# Patient Record
Sex: MALE | Employment: UNEMPLOYED | URBAN - METROPOLITAN AREA
[De-identification: names, ages, dates, MRNs, and addresses within clinical notes are randomized per-mention and may not be internally consistent; named-entity substitution may affect disease eponyms.]

---

## 2021-01-23 ENCOUNTER — APPOINTMENT (OUTPATIENT)
Dept: RADIOLOGY | Facility: MEDICAL CENTER | Age: 61
DRG: 024 | End: 2021-01-23
Attending: STUDENT IN AN ORGANIZED HEALTH CARE EDUCATION/TRAINING PROGRAM
Payer: COMMERCIAL

## 2021-01-23 ENCOUNTER — APPOINTMENT (OUTPATIENT)
Dept: RADIOLOGY | Facility: MEDICAL CENTER | Age: 61
DRG: 024 | End: 2021-01-23
Attending: EMERGENCY MEDICINE
Payer: COMMERCIAL

## 2021-01-23 ENCOUNTER — HOSPITAL ENCOUNTER (INPATIENT)
Facility: MEDICAL CENTER | Age: 61
LOS: 5 days | DRG: 024 | End: 2021-01-28
Attending: EMERGENCY MEDICINE | Admitting: STUDENT IN AN ORGANIZED HEALTH CARE EDUCATION/TRAINING PROGRAM
Payer: COMMERCIAL

## 2021-01-23 DIAGNOSIS — I63.512 CEREBROVASCULAR ACCIDENT (CVA) DUE TO OCCLUSION OF LEFT MIDDLE CEREBRAL ARTERY (HCC): ICD-10-CM

## 2021-01-23 DIAGNOSIS — I63.9 CEREBROVASCULAR ACCIDENT (CVA), UNSPECIFIED MECHANISM (HCC): ICD-10-CM

## 2021-01-23 LAB
ABO + RH BLD: NORMAL
ABO GROUP BLD: NORMAL
ALBUMIN SERPL BCP-MCNC: 4.7 G/DL (ref 3.2–4.9)
ALBUMIN/GLOB SERPL: 1.6 G/DL
ALP SERPL-CCNC: 63 U/L (ref 30–99)
ALT SERPL-CCNC: 28 U/L (ref 2–50)
ANION GAP SERPL CALC-SCNC: 13 MMOL/L (ref 7–16)
APTT PPP: 26.1 SEC (ref 24.7–36)
AST SERPL-CCNC: 28 U/L (ref 12–45)
BASOPHILS # BLD AUTO: 0.6 % (ref 0–1.8)
BASOPHILS # BLD: 0.04 K/UL (ref 0–0.12)
BILIRUB SERPL-MCNC: 0.4 MG/DL (ref 0.1–1.5)
BLD GP AB SCN SERPL QL: NORMAL
BUN SERPL-MCNC: 11 MG/DL (ref 8–22)
CALCIUM SERPL-MCNC: 9.2 MG/DL (ref 8.5–10.5)
CHLORIDE SERPL-SCNC: 103 MMOL/L (ref 96–112)
CO2 SERPL-SCNC: 22 MMOL/L (ref 20–33)
CREAT SERPL-MCNC: 0.88 MG/DL (ref 0.5–1.4)
EKG IMPRESSION: NORMAL
EOSINOPHIL # BLD AUTO: 0.05 K/UL (ref 0–0.51)
EOSINOPHIL NFR BLD: 0.7 % (ref 0–6.9)
ERYTHROCYTE [DISTWIDTH] IN BLOOD BY AUTOMATED COUNT: 47.1 FL (ref 35.9–50)
EST. AVERAGE GLUCOSE BLD GHB EST-MCNC: 123 MG/DL
GLOBULIN SER CALC-MCNC: 3 G/DL (ref 1.9–3.5)
GLUCOSE SERPL-MCNC: 88 MG/DL (ref 65–99)
HBA1C MFR BLD: 5.9 % (ref 0–5.6)
HCT VFR BLD AUTO: 48.8 % (ref 42–52)
HGB BLD-MCNC: 16.6 G/DL (ref 14–18)
IMM GRANULOCYTES # BLD AUTO: 0.02 K/UL (ref 0–0.11)
IMM GRANULOCYTES NFR BLD AUTO: 0.3 % (ref 0–0.9)
INR PPP: 1.11 (ref 0.87–1.13)
LYMPHOCYTES # BLD AUTO: 1.65 K/UL (ref 1–4.8)
LYMPHOCYTES NFR BLD: 24.1 % (ref 22–41)
MAGNESIUM SERPL-MCNC: 2.4 MG/DL (ref 1.5–2.5)
MCH RBC QN AUTO: 33.3 PG (ref 27–33)
MCHC RBC AUTO-ENTMCNC: 34 G/DL (ref 33.7–35.3)
MCV RBC AUTO: 97.8 FL (ref 81.4–97.8)
MONOCYTES # BLD AUTO: 0.75 K/UL (ref 0–0.85)
MONOCYTES NFR BLD AUTO: 10.9 % (ref 0–13.4)
NEUTROPHILS # BLD AUTO: 4.35 K/UL (ref 1.82–7.42)
NEUTROPHILS NFR BLD: 63.4 % (ref 44–72)
NRBC # BLD AUTO: 0 K/UL
NRBC BLD-RTO: 0 /100 WBC
PLATELET # BLD AUTO: 186 K/UL (ref 164–446)
PMV BLD AUTO: 10 FL (ref 9–12.9)
POTASSIUM SERPL-SCNC: 4.2 MMOL/L (ref 3.6–5.5)
PROT SERPL-MCNC: 7.7 G/DL (ref 6–8.2)
PROTHROMBIN TIME: 14.7 SEC (ref 12–14.6)
RBC # BLD AUTO: 4.99 M/UL (ref 4.7–6.1)
RH BLD: NORMAL
SARS-COV+SARS-COV-2 AG RESP QL IA.RAPID: NOTDETECTED
SODIUM SERPL-SCNC: 138 MMOL/L (ref 135–145)
SPECIMEN SOURCE: NORMAL
TROPONIN T SERPL-MCNC: 12 NG/L (ref 6–19)
WBC # BLD AUTO: 6.9 K/UL (ref 4.8–10.8)

## 2021-01-23 PROCEDURE — 71045 X-RAY EXAM CHEST 1 VIEW: CPT

## 2021-01-23 PROCEDURE — U0005 INFEC AGEN DETEC AMPLI PROBE: HCPCS

## 2021-01-23 PROCEDURE — 85730 THROMBOPLASTIN TIME PARTIAL: CPT

## 2021-01-23 PROCEDURE — 70498 CT ANGIOGRAPHY NECK: CPT

## 2021-01-23 PROCEDURE — 86850 RBC ANTIBODY SCREEN: CPT

## 2021-01-23 PROCEDURE — 84484 ASSAY OF TROPONIN QUANT: CPT

## 2021-01-23 PROCEDURE — 70450 CT HEAD/BRAIN W/O DYE: CPT

## 2021-01-23 PROCEDURE — 99223 1ST HOSP IP/OBS HIGH 75: CPT | Performed by: STUDENT IN AN ORGANIZED HEALTH CARE EDUCATION/TRAINING PROGRAM

## 2021-01-23 PROCEDURE — 85025 COMPLETE CBC W/AUTO DIFF WBC: CPT

## 2021-01-23 PROCEDURE — 83036 HEMOGLOBIN GLYCOSYLATED A1C: CPT

## 2021-01-23 PROCEDURE — 96375 TX/PRO/DX INJ NEW DRUG ADDON: CPT

## 2021-01-23 PROCEDURE — 86900 BLOOD TYPING SEROLOGIC ABO: CPT

## 2021-01-23 PROCEDURE — U0003 INFECTIOUS AGENT DETECTION BY NUCLEIC ACID (DNA OR RNA); SEVERE ACUTE RESPIRATORY SYNDROME CORONAVIRUS 2 (SARS-COV-2) (CORONAVIRUS DISEASE [COVID-19]), AMPLIFIED PROBE TECHNIQUE, MAKING USE OF HIGH THROUGHPUT TECHNOLOGIES AS DESCRIBED BY CMS-2020-01-R: HCPCS

## 2021-01-23 PROCEDURE — 700117 HCHG RX CONTRAST REV CODE 255: Performed by: EMERGENCY MEDICINE

## 2021-01-23 PROCEDURE — 85610 PROTHROMBIN TIME: CPT

## 2021-01-23 PROCEDURE — 99291 CRITICAL CARE FIRST HOUR: CPT

## 2021-01-23 PROCEDURE — 0042T CT-CEREBRAL PERFUSION ANALYSIS: CPT

## 2021-01-23 PROCEDURE — 36415 COLL VENOUS BLD VENIPUNCTURE: CPT

## 2021-01-23 PROCEDURE — 770022 HCHG ROOM/CARE - ICU (200)

## 2021-01-23 PROCEDURE — 80053 COMPREHEN METABOLIC PANEL: CPT

## 2021-01-23 PROCEDURE — 86901 BLOOD TYPING SEROLOGIC RH(D): CPT

## 2021-01-23 PROCEDURE — 700111 HCHG RX REV CODE 636 W/ 250 OVERRIDE (IP)

## 2021-01-23 PROCEDURE — 70496 CT ANGIOGRAPHY HEAD: CPT

## 2021-01-23 PROCEDURE — 700111 HCHG RX REV CODE 636 W/ 250 OVERRIDE (IP): Performed by: RADIOLOGY

## 2021-01-23 PROCEDURE — 83735 ASSAY OF MAGNESIUM: CPT

## 2021-01-23 PROCEDURE — 93005 ELECTROCARDIOGRAM TRACING: CPT | Performed by: EMERGENCY MEDICINE

## 2021-01-23 PROCEDURE — 87426 SARSCOV CORONAVIRUS AG IA: CPT

## 2021-01-23 PROCEDURE — 99221 1ST HOSP IP/OBS SF/LOW 40: CPT | Performed by: PSYCHIATRY & NEUROLOGY

## 2021-01-23 RX ORDER — AMOXICILLIN 250 MG
2 CAPSULE ORAL 2 TIMES DAILY
Status: DISCONTINUED | OUTPATIENT
Start: 2021-01-24 | End: 2021-01-28 | Stop reason: HOSPADM

## 2021-01-23 RX ORDER — EPTIFIBATIDE 0.75 MG/ML
2 INJECTION, SOLUTION INTRAVENOUS CONTINUOUS
Status: DISPENSED | OUTPATIENT
Start: 2021-01-24 | End: 2021-01-24

## 2021-01-23 RX ORDER — ASPIRIN 300 MG/1
300 SUPPOSITORY RECTAL DAILY
Status: DISCONTINUED | OUTPATIENT
Start: 2021-01-25 | End: 2021-01-24

## 2021-01-23 RX ORDER — MIDAZOLAM HYDROCHLORIDE 1 MG/ML
INJECTION INTRAMUSCULAR; INTRAVENOUS
Status: COMPLETED
Start: 2021-01-23 | End: 2021-01-24

## 2021-01-23 RX ORDER — ASPIRIN 325 MG
325 TABLET ORAL DAILY
Status: DISCONTINUED | OUTPATIENT
Start: 2021-01-25 | End: 2021-01-24

## 2021-01-23 RX ORDER — BISACODYL 10 MG
10 SUPPOSITORY, RECTAL RECTAL
Status: DISCONTINUED | OUTPATIENT
Start: 2021-01-23 | End: 2021-01-28 | Stop reason: HOSPADM

## 2021-01-23 RX ORDER — POLYETHYLENE GLYCOL 3350 17 G/17G
1 POWDER, FOR SOLUTION ORAL
Status: DISCONTINUED | OUTPATIENT
Start: 2021-01-23 | End: 2021-01-28 | Stop reason: HOSPADM

## 2021-01-23 RX ORDER — ASPIRIN 81 MG/1
324 TABLET, CHEWABLE ORAL DAILY
Status: DISCONTINUED | OUTPATIENT
Start: 2021-01-25 | End: 2021-01-24

## 2021-01-23 RX ADMIN — IOHEXOL 40 ML: 350 INJECTION, SOLUTION INTRAVENOUS at 22:15

## 2021-01-23 RX ADMIN — EPTIFIBATIDE 12600 MCG: 2 INJECTION, SOLUTION INTRAVENOUS at 23:56

## 2021-01-23 RX ADMIN — IOHEXOL 80 ML: 350 INJECTION, SOLUTION INTRAVENOUS at 22:21

## 2021-01-24 ENCOUNTER — APPOINTMENT (OUTPATIENT)
Dept: RADIOLOGY | Facility: MEDICAL CENTER | Age: 61
DRG: 024 | End: 2021-01-24
Attending: INTERNAL MEDICINE
Payer: COMMERCIAL

## 2021-01-24 ENCOUNTER — APPOINTMENT (OUTPATIENT)
Dept: RADIOLOGY | Facility: MEDICAL CENTER | Age: 61
DRG: 024 | End: 2021-01-24
Attending: STUDENT IN AN ORGANIZED HEALTH CARE EDUCATION/TRAINING PROGRAM
Payer: COMMERCIAL

## 2021-01-24 ENCOUNTER — APPOINTMENT (OUTPATIENT)
Dept: RADIOLOGY | Facility: MEDICAL CENTER | Age: 61
DRG: 024 | End: 2021-01-24
Attending: PSYCHIATRY & NEUROLOGY
Payer: COMMERCIAL

## 2021-01-24 ENCOUNTER — APPOINTMENT (OUTPATIENT)
Dept: CARDIOLOGY | Facility: MEDICAL CENTER | Age: 61
DRG: 024 | End: 2021-01-24
Attending: STUDENT IN AN ORGANIZED HEALTH CARE EDUCATION/TRAINING PROGRAM
Payer: COMMERCIAL

## 2021-01-24 DIAGNOSIS — I63.512 CEREBROVASCULAR ACCIDENT (CVA) DUE TO OCCLUSION OF LEFT MIDDLE CEREBRAL ARTERY (HCC): Primary | ICD-10-CM

## 2021-01-24 PROBLEM — R73.03 PREDIABETES: Status: ACTIVE | Noted: 2021-01-24

## 2021-01-24 PROBLEM — F10.10 ALCOHOL ABUSE: Status: ACTIVE | Noted: 2021-01-24

## 2021-01-24 PROBLEM — I65.21 CAROTID OCCLUSION, RIGHT: Status: ACTIVE | Noted: 2021-01-24

## 2021-01-24 PROBLEM — I65.22 INTERNAL CAROTID ARTERY OCCLUSION, LEFT: Status: ACTIVE | Noted: 2021-01-24

## 2021-01-24 PROBLEM — Z72.0 TOBACCO ABUSE: Status: ACTIVE | Noted: 2021-01-24

## 2021-01-24 LAB
ALBUMIN SERPL BCP-MCNC: 3.5 G/DL (ref 3.2–4.9)
ALBUMIN/GLOB SERPL: 1.4 G/DL
ALP SERPL-CCNC: 50 U/L (ref 30–99)
ALT SERPL-CCNC: 22 U/L (ref 2–50)
AMPHET UR QL SCN: NEGATIVE
ANION GAP SERPL CALC-SCNC: 13 MMOL/L (ref 7–16)
AST SERPL-CCNC: 18 U/L (ref 12–45)
BARBITURATES UR QL SCN: NEGATIVE
BASOPHILS # BLD AUTO: 0.4 % (ref 0–1.8)
BASOPHILS # BLD: 0.03 K/UL (ref 0–0.12)
BENZODIAZ UR QL SCN: NEGATIVE
BILIRUB SERPL-MCNC: 0.5 MG/DL (ref 0.1–1.5)
BUN SERPL-MCNC: 10 MG/DL (ref 8–22)
BZE UR QL SCN: NEGATIVE
CALCIUM SERPL-MCNC: 7.6 MG/DL (ref 8.5–10.5)
CANNABINOIDS UR QL SCN: NEGATIVE
CHLORIDE SERPL-SCNC: 102 MMOL/L (ref 96–112)
CHOLEST SERPL-MCNC: 149 MG/DL (ref 100–199)
CO2 SERPL-SCNC: 19 MMOL/L (ref 20–33)
CREAT SERPL-MCNC: 0.9 MG/DL (ref 0.5–1.4)
EOSINOPHIL # BLD AUTO: 0.03 K/UL (ref 0–0.51)
EOSINOPHIL NFR BLD: 0.4 % (ref 0–6.9)
ERYTHROCYTE [DISTWIDTH] IN BLOOD BY AUTOMATED COUNT: 47.8 FL (ref 35.9–50)
GLOBULIN SER CALC-MCNC: 2.5 G/DL (ref 1.9–3.5)
GLUCOSE SERPL-MCNC: 96 MG/DL (ref 65–99)
HCT VFR BLD AUTO: 40.3 % (ref 42–52)
HDLC SERPL-MCNC: 37 MG/DL
HGB BLD-MCNC: 13.7 G/DL (ref 14–18)
IMM GRANULOCYTES # BLD AUTO: 0.03 K/UL (ref 0–0.11)
IMM GRANULOCYTES NFR BLD AUTO: 0.4 % (ref 0–0.9)
LDLC SERPL CALC-MCNC: 80 MG/DL
LV EJECT FRACT MOD 2C 99903: 77.69
LV EJECT FRACT MOD 4C 99902: 73.84
LV EJECT FRACT MOD BP 99901: 75.03
LYMPHOCYTES # BLD AUTO: 1.07 K/UL (ref 1–4.8)
LYMPHOCYTES NFR BLD: 14.8 % (ref 22–41)
MCH RBC QN AUTO: 33.7 PG (ref 27–33)
MCHC RBC AUTO-ENTMCNC: 34 G/DL (ref 33.7–35.3)
MCV RBC AUTO: 99 FL (ref 81.4–97.8)
METHADONE UR QL SCN: NEGATIVE
MONOCYTES # BLD AUTO: 0.57 K/UL (ref 0–0.85)
MONOCYTES NFR BLD AUTO: 7.9 % (ref 0–13.4)
NEUTROPHILS # BLD AUTO: 5.5 K/UL (ref 1.82–7.42)
NEUTROPHILS NFR BLD: 76.1 % (ref 44–72)
NRBC # BLD AUTO: 0 K/UL
NRBC BLD-RTO: 0 /100 WBC
OPIATES UR QL SCN: NEGATIVE
OXYCODONE UR QL SCN: NEGATIVE
PCP UR QL SCN: NEGATIVE
PLATELET # BLD AUTO: 160 K/UL (ref 164–446)
PMV BLD AUTO: 10 FL (ref 9–12.9)
POTASSIUM SERPL-SCNC: 4.7 MMOL/L (ref 3.6–5.5)
PROPOXYPH UR QL SCN: NEGATIVE
PROT SERPL-MCNC: 6 G/DL (ref 6–8.2)
RBC # BLD AUTO: 4.07 M/UL (ref 4.7–6.1)
SARS-COV-2 RNA RESP QL NAA+PROBE: NOTDETECTED
SODIUM SERPL-SCNC: 134 MMOL/L (ref 135–145)
SPECIMEN SOURCE: NORMAL
TRIGL SERPL-MCNC: 162 MG/DL (ref 0–149)
WBC # BLD AUTO: 7.2 K/UL (ref 4.8–10.8)

## 2021-01-24 PROCEDURE — 700111 HCHG RX REV CODE 636 W/ 250 OVERRIDE (IP): Performed by: INTERNAL MEDICINE

## 2021-01-24 PROCEDURE — 80061 LIPID PANEL: CPT

## 2021-01-24 PROCEDURE — 80307 DRUG TEST PRSMV CHEM ANLYZR: CPT

## 2021-01-24 PROCEDURE — 96375 TX/PRO/DX INJ NEW DRUG ADDON: CPT

## 2021-01-24 PROCEDURE — 037L3DZ DILATION OF LEFT INTERNAL CAROTID ARTERY WITH INTRALUMINAL DEVICE, PERCUTANEOUS APPROACH: ICD-10-PCS | Performed by: RADIOLOGY

## 2021-01-24 PROCEDURE — 770022 HCHG ROOM/CARE - ICU (200)

## 2021-01-24 PROCEDURE — 700111 HCHG RX REV CODE 636 W/ 250 OVERRIDE (IP): Performed by: STUDENT IN AN ORGANIZED HEALTH CARE EDUCATION/TRAINING PROGRAM

## 2021-01-24 PROCEDURE — 700102 HCHG RX REV CODE 250 W/ 637 OVERRIDE(OP): Performed by: STUDENT IN AN ORGANIZED HEALTH CARE EDUCATION/TRAINING PROGRAM

## 2021-01-24 PROCEDURE — 93306 TTE W/DOPPLER COMPLETE: CPT

## 2021-01-24 PROCEDURE — A9270 NON-COVERED ITEM OR SERVICE: HCPCS | Performed by: STUDENT IN AN ORGANIZED HEALTH CARE EDUCATION/TRAINING PROGRAM

## 2021-01-24 PROCEDURE — 99292 CRITICAL CARE ADDL 30 MIN: CPT | Performed by: INTERNAL MEDICINE

## 2021-01-24 PROCEDURE — 85025 COMPLETE CBC W/AUTO DIFF WBC: CPT

## 2021-01-24 PROCEDURE — 96365 THER/PROPH/DIAG IV INF INIT: CPT

## 2021-01-24 PROCEDURE — C1887 CATHETER, GUIDING: HCPCS

## 2021-01-24 PROCEDURE — 700105 HCHG RX REV CODE 258: Performed by: INTERNAL MEDICINE

## 2021-01-24 PROCEDURE — 92610 EVALUATE SWALLOWING FUNCTION: CPT

## 2021-01-24 PROCEDURE — 80053 COMPREHEN METABOLIC PANEL: CPT

## 2021-01-24 PROCEDURE — 03CL3ZZ EXTIRPATION OF MATTER FROM LEFT INTERNAL CAROTID ARTERY, PERCUTANEOUS APPROACH: ICD-10-PCS | Performed by: RADIOLOGY

## 2021-01-24 PROCEDURE — 70551 MRI BRAIN STEM W/O DYE: CPT

## 2021-01-24 PROCEDURE — 700105 HCHG RX REV CODE 258: Performed by: RADIOLOGY

## 2021-01-24 PROCEDURE — 700111 HCHG RX REV CODE 636 W/ 250 OVERRIDE (IP): Performed by: RADIOLOGY

## 2021-01-24 PROCEDURE — 70450 CT HEAD/BRAIN W/O DYE: CPT

## 2021-01-24 PROCEDURE — 700111 HCHG RX REV CODE 636 W/ 250 OVERRIDE (IP)

## 2021-01-24 PROCEDURE — 700117 HCHG RX CONTRAST REV CODE 255: Performed by: RADIOLOGY

## 2021-01-24 PROCEDURE — 70544 MR ANGIOGRAPHY HEAD W/O DYE: CPT

## 2021-01-24 PROCEDURE — 99291 CRITICAL CARE FIRST HOUR: CPT | Performed by: INTERNAL MEDICINE

## 2021-01-24 PROCEDURE — 99153 MOD SED SAME PHYS/QHP EA: CPT

## 2021-01-24 PROCEDURE — 93306 TTE W/DOPPLER COMPLETE: CPT | Mod: 26 | Performed by: STUDENT IN AN ORGANIZED HEALTH CARE EDUCATION/TRAINING PROGRAM

## 2021-01-24 RX ORDER — GAUZE BANDAGE 2" X 2"
100 BANDAGE TOPICAL DAILY
Status: DISCONTINUED | OUTPATIENT
Start: 2021-01-27 | End: 2021-01-28 | Stop reason: HOSPADM

## 2021-01-24 RX ORDER — ASPIRIN 325 MG
325 TABLET ORAL ONCE
Status: COMPLETED | OUTPATIENT
Start: 2021-01-24 | End: 2021-01-24

## 2021-01-24 RX ORDER — FOLIC ACID 1 MG/1
1 TABLET ORAL DAILY
Status: DISCONTINUED | OUTPATIENT
Start: 2021-01-24 | End: 2021-01-28 | Stop reason: HOSPADM

## 2021-01-24 RX ORDER — CLOPIDOGREL BISULFATE 75 MG/1
75 TABLET ORAL DAILY
Status: DISCONTINUED | OUTPATIENT
Start: 2021-01-25 | End: 2021-01-28 | Stop reason: HOSPADM

## 2021-01-24 RX ORDER — MIDAZOLAM HYDROCHLORIDE 1 MG/ML
.5-2 INJECTION INTRAMUSCULAR; INTRAVENOUS PRN
Status: ACTIVE | OUTPATIENT
Start: 2021-01-24 | End: 2021-01-24

## 2021-01-24 RX ORDER — FOLIC ACID 1 MG/1
1 TABLET ORAL DAILY
Status: DISCONTINUED | OUTPATIENT
Start: 2021-01-24 | End: 2021-01-24

## 2021-01-24 RX ORDER — SODIUM CHLORIDE 9 MG/ML
500 INJECTION, SOLUTION INTRAVENOUS
Status: ACTIVE | OUTPATIENT
Start: 2021-01-24 | End: 2021-01-24

## 2021-01-24 RX ORDER — M-VIT,TX,IRON,MINS/CALC/FOLIC 27MG-0.4MG
1 TABLET ORAL DAILY
Status: DISCONTINUED | OUTPATIENT
Start: 2021-01-24 | End: 2021-01-28 | Stop reason: HOSPADM

## 2021-01-24 RX ORDER — ATORVASTATIN CALCIUM 80 MG/1
80 TABLET, FILM COATED ORAL EVERY EVENING
Status: DISCONTINUED | OUTPATIENT
Start: 2021-01-24 | End: 2021-01-28 | Stop reason: HOSPADM

## 2021-01-24 RX ORDER — CLOPIDOGREL BISULFATE 75 MG/1
300 TABLET ORAL ONCE
Status: COMPLETED | OUTPATIENT
Start: 2021-01-24 | End: 2021-01-24

## 2021-01-24 RX ORDER — SODIUM CHLORIDE 9 MG/ML
1000 INJECTION, SOLUTION INTRAVENOUS ONCE
Status: COMPLETED | OUTPATIENT
Start: 2021-01-24 | End: 2021-01-24

## 2021-01-24 RX ORDER — EPTIFIBATIDE 2 MG/ML
180 INJECTION, SOLUTION INTRAVENOUS ONCE
Status: COMPLETED | OUTPATIENT
Start: 2021-01-24 | End: 2021-01-23

## 2021-01-24 RX ADMIN — SODIUM CHLORIDE 500 ML: 9 INJECTION, SOLUTION INTRAVENOUS at 07:41

## 2021-01-24 RX ADMIN — IOHEXOL 80 ML: 300 INJECTION, SOLUTION INTRAVENOUS at 01:00

## 2021-01-24 RX ADMIN — THIAMINE HYDROCHLORIDE 100 MG: 100 INJECTION, SOLUTION INTRAMUSCULAR; INTRAVENOUS at 06:15

## 2021-01-24 RX ADMIN — SODIUM CHLORIDE 500 ML: 9 INJECTION, SOLUTION INTRAVENOUS at 00:20

## 2021-01-24 RX ADMIN — FOLIC ACID 1 MG: 1 TABLET ORAL at 10:17

## 2021-01-24 RX ADMIN — MIDAZOLAM HYDROCHLORIDE 1 MG: 1 INJECTION, SOLUTION INTRAMUSCULAR; INTRAVENOUS at 00:08

## 2021-01-24 RX ADMIN — ATROPINE SULFATE 0.5 MG: 0.1 INJECTION, SOLUTION INTRAVENOUS at 00:05

## 2021-01-24 RX ADMIN — ATROPINE SULFATE 0.5 MG: 0.1 INJECTION, SOLUTION INTRAVENOUS at 00:06

## 2021-01-24 RX ADMIN — SODIUM CHLORIDE 1000 ML: 9 INJECTION, SOLUTION INTRAVENOUS at 06:15

## 2021-01-24 RX ADMIN — SODIUM CHLORIDE 500 ML: 9 INJECTION, SOLUTION INTRAVENOUS at 00:00

## 2021-01-24 RX ADMIN — ASPIRIN 325 MG: 325 TABLET ORAL at 10:17

## 2021-01-24 RX ADMIN — ATORVASTATIN CALCIUM 80 MG: 80 TABLET, FILM COATED ORAL at 21:12

## 2021-01-24 RX ADMIN — FENTANYL CITRATE 50 MCG: 50 INJECTION, SOLUTION INTRAMUSCULAR; INTRAVENOUS at 00:08

## 2021-01-24 RX ADMIN — MIDAZOLAM HYDROCHLORIDE 1 MG: 1 INJECTION INTRAMUSCULAR; INTRAVENOUS at 00:08

## 2021-01-24 RX ADMIN — EPTIFIBATIDE 2 MCG/KG/MIN: 0.75 INJECTION, SOLUTION INTRAVENOUS at 00:25

## 2021-01-24 RX ADMIN — THIAMINE HYDROCHLORIDE 500 MG: 100 INJECTION, SOLUTION INTRAMUSCULAR; INTRAVENOUS at 11:56

## 2021-01-24 RX ADMIN — EPTIFIBATIDE 2 MCG/KG/MIN: 0.75 INJECTION, SOLUTION INTRAVENOUS at 07:58

## 2021-01-24 RX ADMIN — CLOPIDOGREL BISULFATE 300 MG: 75 TABLET ORAL at 10:17

## 2021-01-24 ASSESSMENT — FIBROSIS 4 INDEX: FIB4 SCORE: 1.65

## 2021-01-24 NOTE — PROGRESS NOTES
"Neurology Progress Note  Neurohospitalist Service, [facility]    Referring Physician: Oliverio Garcia M.D.    Reason for referral: Stroke Code 1/23/21.     To obtain the most accurate data regarding the time called, and time patient seen, refer to the stroke run-sheet and chart.  For time of CT, refer to the radiology report. See A&P below for TPA Decision and door to needle time if and when applicable.    HPI: 60-year-old man with history obtained from chart, as patient is unable to give any history due to aphasia. Presented 1/23/2021 from OSH with aphasia, found to have L M1 occlusion and ICA stenosis. He was apparently speaking on the phone to a friend ( ~5:30 pm) when he suddenly stopped talking, which prompted his friend to drive to the house and patient was found on the floor. He was taken to OSH in [place], CT head noncontrast negative, transferred to [facility] where CTA head/neck revealed left M1 Occlusion and 70% stenosis of the left internal carotid and 50-70% stenosis of the right internal carotid. Dr. Cordero preformed Left MCA thrombectomy and L carotid angioplasty with stenting. TICI 2b flow. He was placed on integrelin drip and admitted to ICU.     Interval Note 1/24/21: Event overnight per Dr. Lua 1/24 at 3:54 pm, \"Called regarding a possible seizure. By the time I arrived the patient was somnolent but overall stable neuro exam as before. Given his risk for cerebral hemorrhage stat CT was obtained and reassuring. No further management changes.\" This morning patient is alert with global aphasia, perseverates on \"ya\". Unable to name and repeat. Does not comprehend commands. Unable to read or write. Mild facial asymmetry.     Review of systems: In addition to what is detailed in the HPI above, (and scanned into the chart if and when applicable), all other systems reviewed and are negative.    Past Medical History:    has no past medical history on file.  Unknown    FHx:  family history " is not on file.  Unknown, pt unable to provide    SHx:   Unknown    Allergies:  Not on File    Medications:    Current Facility-Administered Medications:   •  niCARdipine (CARDENE) 25 mg in  mL Infusion, 0-15 mg/hr, Intravenous, Continuous, Shant Kern M.D., Stopped at 01/24/21 0030  •  atorvastatin (LIPITOR) tablet 80 mg, 80 mg, Oral, Q EVENING, Oliverio Garcia M.D.  •  [START ON 1/25/2021] aspirin EC (ECOTRIN) tablet 81 mg, 81 mg, Oral, DAILY, Oliverio Garcia M.D.  •  [START ON 1/25/2021] clopidogrel (PLAVIX) tablet 75 mg, 75 mg, Oral, DAILY, Oliverio Garcia M.D.  •  therapeutic multivitamin-minerals (THERAGRAN-M) tablet 1 Tab, 1 Tab, Oral, DAILY, Johan Lua M.D., Stopped at 01/24/21 0600  •  MD Alert...ICU Electrolyte Replacement per Pharmacy, , Other, PHARMACY TO DOSE, Johan Lua M.D.  •  folic acid (FOLVITE) tablet 1 mg, 1 mg, Oral, DAILY, Oliverio Garcia M.D., 1 mg at 01/24/21 1017  •  thiamine (B-1) 500 mg in 5% dextrose 100 mL IVPB, 500 mg, Intravenous, DAILY, Last Rate: 200 mL/hr at 01/24/21 1156, 500 mg at 01/24/21 1156 **FOLLOWED BY** [START ON 1/27/2021] thiamine (Vitamin B-1) tablet 100 mg, 100 mg, Oral, DAILY, Torey Benton M.D.  •  senna-docusate (PERICOLACE or SENOKOT S) 8.6-50 MG per tablet 2 Tab, 2 Tab, Oral, BID, Stopped at 01/24/21 0600 **AND** polyethylene glycol/lytes (MIRALAX) PACKET 1 Packet, 1 Packet, Oral, QDAY PRN **AND** magnesium hydroxide (MILK OF MAGNESIA) suspension 30 mL, 30 mL, Oral, QDAY PRN **AND** bisacodyl (DULCOLAX) suppository 10 mg, 10 mg, Rectal, QDAY PRN, Oliverio Garcia M.D.  •  eptifibatide 0.75 mg/mL (INTEGRILIN) infusion, 2 mcg/kg/min, Intravenous, Continuous, Oliverio Garcia M.D., Stopped at 01/24/21 1344    Physical Examination:    Vitals:    01/24/21 1100 01/24/21 1130 01/24/21 1200 01/24/21 1300   BP: 100/58 (!) 84/47 (!) 93/51 121/56   Pulse: 63 63 61 74   Resp: 17 17 15 18   Temp:   36.7 °C (98 °F)    TempSrc:   Temporal   "  SpO2: 95% 97% 99% 99%   Weight:       Height:           General: Patient is awake and in no acute distress  Eyes: examination of optic disks not indicated at this time  CV: RRR    NEUROLOGICAL EXAM:     Mental status: Awake but not following commands.    Speech and language: globally aphasic. Perseverates on \"ya\". Unable to name and repeat. Does not comprehend commands. Unable to read or write.  Cranial nerve exam: Pupils are 3mm, equal, round and reactive to light bilaterally. Blinks to threat bilaterally. Extraocular muscles appear intact as patient tracks people in the room. Right NLF flattening. Hearing to finger rub equal. Palate elevates symmetrically. Shoulder shrug is full. Tongue is midline.  Motor exam: Strength left side appears to be normal right side appears to be 4-4+ arm and leg.  Sensory exam: Left skin was seen to noxious stimuli on right side  Deep tendon reflexes: Upgoing toe on the right downgoing on the left  Coordination: no ataxia   Gait: deferred due to severe confusion stroke alert.      Objective Data:    Labs:  Lab Results   Component Value Date/Time    PROTHROMBTM 14.7 (H) 01/23/2021 10:07 PM    INR 1.11 01/23/2021 10:07 PM      Lab Results   Component Value Date/Time    WBC 7.2 01/24/2021 02:10 AM    RBC 4.07 (L) 01/24/2021 02:10 AM    HEMOGLOBIN 13.7 (L) 01/24/2021 02:10 AM    HEMATOCRIT 40.3 (L) 01/24/2021 02:10 AM    MCV 99.0 (H) 01/24/2021 02:10 AM    MCH 33.7 (H) 01/24/2021 02:10 AM    MCHC 34.0 01/24/2021 02:10 AM    MPV 10.0 01/24/2021 02:10 AM    NEUTSPOLYS 76.10 (H) 01/24/2021 02:10 AM    LYMPHOCYTES 14.80 (L) 01/24/2021 02:10 AM    MONOCYTES 7.90 01/24/2021 02:10 AM    EOSINOPHILS 0.40 01/24/2021 02:10 AM    BASOPHILS 0.40 01/24/2021 02:10 AM      Lab Results   Component Value Date/Time    SODIUM 134 (L) 01/24/2021 02:10 AM    POTASSIUM 4.7 01/24/2021 02:10 AM    CHLORIDE 102 01/24/2021 02:10 AM    CO2 19 (L) 01/24/2021 02:10 AM    GLUCOSE 96 01/24/2021 02:10 AM    BUN 10 " 01/24/2021 02:10 AM    CREATININE 0.90 01/24/2021 02:10 AM      Lab Results   Component Value Date/Time    CHOLSTRLTOT 149 01/24/2021 02:10 AM    LDL 80 01/24/2021 02:10 AM    HDL 37 (A) 01/24/2021 02:10 AM    TRIGLYCERIDE 162 (H) 01/24/2021 02:10 AM       Lab Results   Component Value Date/Time    ALKPHOSPHAT 50 01/24/2021 02:10 AM    ASTSGOT 18 01/24/2021 02:10 AM    ALTSGPT 22 01/24/2021 02:10 AM    TBILIRUBIN 0.5 01/24/2021 02:10 AM        Imaging/Testing:  EC-ECHOCARDIOGRAM COMPLETE W/O CONT         CT-HEAD W/O   Final Result      Findings consistent with early changes of left MCA territory infarction.               INTERPRETING LOCATION:  [address] [place] [address], [id number]      IR-THROMBO MECHANICAL ARTERY,INIT   Final Result      1.Occluded right common carotid artery.   *  No intervention was performed.   2.Severe stenosis at the origin of left internal carotid artery.   *  Successful left carotid angioplasty and stent placement with embolic protection device.   3.Left M1 segment thrombus.   *  Successful mechanical thrombectomy   *  TICI 2b flow.      DX-CHEST-PORTABLE (1 VIEW)   Final Result      No acute cardiopulmonary abnormality.      CT-CTA NECK WITH & W/O-POST PROCESSING   Final Result      1.  Occlusion of the right common carotid artery just distal to its origin related intraluminal thrombus which extends from that point to the carotid bifurcation.   2.  High-grade stenosis of both internal carotid arterial origins      CT-CTA HEAD WITH & W/O-POST PROCESS   Final Result      1.  Occlusion of the M1 segment of the left middle cerebral artery.   2.  Unremarkable CT angiogram of the head otherwise.   3.  No acute abnormality of the precontrast scan.      These findings were discussed with RADHA HUMPHRIES on 1/23/2021 at 2236 hours.      CT-CEREBRAL PERFUSION ANALYSIS   Final Result      1.  Cerebral blood flow less than 30% likely representing completed infarct = 17 mL.      2.  T Max more than 6 seconds  likely representing combination of completed infarct and ischemia = 138 mL.      3.  Mismatched volume likely representing ischemic brain/penumbra =  121 mL      4.  Please note that the cerebral perfusion was performed on the limited brain tissue around the basal ganglia region. Infarct/ischemia outside the CT perfusion sections can be missed in this study.      CT-HEAD W/O   Final Result      Normal CT scan of the head without contrast.               INTERPRETING LOCATION:  [address], [place], [address]      MR-BRAIN-W/O    (Results Pending)   MR-MRA HEAD-W/O    (Results Pending)       Assessment and Plan:    60-year-old male transferred from outside facility found to have a left M1 occlusion along with bilateral severe carotid stenosis including a right common carotid occlusion.  He is not a TPA candidate due to presenting outside of window. Underwent M1 thrombectomy with left ICA stenting. TICI 2B with BP goal < 160. He was admitted to ICU on integrellin and Cardene drips. Repeat CT head does not show hemorrhage, so he may be started on ASA and plavix loads today, with cessation of integrellin 4 hours s/p plavix. He will need NGT to facilitate administration of PO meds. Suspect etiology for stroke is atherosclerotic. The patient is in stable condition in ICU.      Plan:  -  Q4hr neurochecks, VS per unit.  -  Systolic blood pressure < 160, based on TICI score 2B  -.  Administer plavix and ASA since he passed swallow. Integrellin to be stopped 4 hours following Plavix administration. Continue DAPT, asa 81 and plavix 75mg qd; continue statin.  -  MRI brain  -  Telemonitoring  -  Stat CT head if change in neuro status  -  Glucose control per primary recommendations normoglycemia  -  TTE with bubble  -  PT/OT/speech    The evaluation of the patient, and recommended management, was discussed with attending neurologist, Dr. Tadeo Rudd, and intensivist, Dr. Torey Benton.    Corinne Canavero, APRN  Acute Care  Neurohospitalist Service

## 2021-01-24 NOTE — PROGRESS NOTES
Status post left carotid angioplasty, stent placement on the left MCA thrombectomy.    TICI 2b flow     Neuro interventional recommendation:  Patient is on Integrilin infusion x24 hours  CT scan at 6:00 AM  If there is no hemorrhage on the CT scan,Please give  loading doses of aspirin 325 mg and Plavix 300 mg at 10:00 AM on 1/24/2021  Stop the Integrilin drip at 2:00 PM on 1/24/2021.  Please keep the systolic blood pressure less than 140 to reduce the chance of reperfusion injury.

## 2021-01-24 NOTE — PROGRESS NOTES
60-year-old male with left hemispheric stroke symptoms.  The CT angiogram demonstrates occluded right common carotid artery and severe stenosis of the origins of the bilateral internal carotid arteries. There is occlusion of the left M1 segment.    CT perfusion images demonstrates large area of ischemia. The CT brain does not demonstrate any completed infarct.                    Neuro interventional recommendations:  Left carotid angioplasty and stent placement with embolic protection device  Left middle cerebral thrombectomy

## 2021-01-24 NOTE — PLAN OF CARE (IOPOC)
Called regarding a possible seizure. By the time I arrived the patient was somnolent but overall stable neuro exam as before. Given his risk for cerebral hemorrhage stat CT was obtained and reassuring. No further management changes - will coordinate with IR for the need to repeat CT as planned at 6 AM or use this one.    Johan Lua M.D.

## 2021-01-24 NOTE — ASSESSMENT & PLAN NOTE
Mr. Leija presented on 1/23/2021 with complaints of right-sided facial droop and aphasia from an outside facility.  He was found to have a left M1 thrombus and underwent mechanical thrombectomy which was successful.  Additionally he was found to have left-sided carotid stenosis of 70% for which a stent was placed.  He was then started on an Integrilin drip and transferred to the intensive care unit.  He was also noted to have right common carotid artery occlusion with retrograde filling. He had some post-intervention bradycardia and hypotension likely secondary to carotid hypersensitivity after intervention. Due to timing he did not receive TPA.  -MRI 1/25/2021 showed acute left MCA infarct as well as bilateral scattered tiny embolic infarcts in both hemispheres. Echocardiography did not show any acute thrombus but did show bi-atrial enlargement consistent with possible underlying atrial fibrillation.  -Frequent neurochecks Q4H  -DAPT with clopidogrel and aspirin for 6 months  -Atorvastatin 80mg daily  -Outpatient neuroIR follow-up  -Transfer to medical floor  -Telemetry monitoring   -Will need Zio patch or other ambulatory electrocardiography, recommend this is obtained by primary care provider    1/26   -PT/OT/SLP, minimal assist  Patient reports having 24/ 7 assistance with  Roommates  Current physiatry, no rehab needs  Continue cardiac not monitoring, outpatient follow-up with primary care provider for referral for cardiac monitoring    1/27 right visual field vision loss, as per pt chronic  Repeat CT head, cw known infarct  D/w neurology, encourage activity, cleared for dc with assistance  Home health assistance pending          -Outpatient tobacco and alcohol cessation

## 2021-01-24 NOTE — ED PROVIDER NOTES
"ED Provider Note    Scribed for Travis Pagan M.D. by Butch Gómez. 1/23/2021,  10:08 PM.    Means of Arrival: Ambulance  History obtained from: EMS  History limited by: Severe Aphasia    CHIEF COMPLAINT  Chief Complaint   Patient presents with   • Possible Stroke       HPI  Sánchez Leija is a 60 y.o. male who presents to the Emergency Department via ambulance transfer from [facility] for evaluation of a possible stroke onset today prior to arrival. Patient was last seen normal at 12 PM. He spoke on the phone with a friend around 5:30 PM who noticed aphasia. Per EMS, patient was found to have complete paresis in the right upper and lower extremities and right sided facial droop. They note that the patient was able to move all extremities en route. At [facility], CT and COVID were negative, and labs were significant for an alcohol of 0.126.     History limited by: Severe Aphasia    I verified that the patient was wearing a mask and I was wearing appropriate PPE every time I entered the room. The patient's mask was on the patient at all times during my encounter except for a brief view of the oropharynx.    REVIEW OF SYSTEMS  NEUROLOGIC:   Possible stroke. Right sided facial droop, right sided arm and leg weakness.   Review of Systems limited by: Severe Aphasia    See HPI for further details.     PAST MEDICAL HISTORY  No past medical history noted.    FAMILY HISTORY  No family history noted.    SOCIAL HISTORY   None noted    SURGICAL HISTORY  No past surgical history noted.    CURRENT MEDICATIONS  No current outpatient medications    ALLERGIES  Not noted    PHYSICAL EXAM  VITAL SIGNS: BP (!) 188/105   Pulse (!) 101   Temp 36.8 °C (98.3 °F) (Temporal)   Resp (!) 24   Ht 1.88 m (6' 2\")   Wt 70 kg (154 lb 5.2 oz)   SpO2 99%   BMI 19.81 kg/m²    Gen: Alert, no acute distress  HEENT: ATNC  Eyes: PERRL, EOMI, normal conjunctiva.   Neck: trachea midline  Resp: no respiratory distress, bilaterally clear " to auscultation  CV: No JVD, regular rate and rhythm  Abd: non-distended, nontender  Ext: No deformities, no calf tenderness.  Psych: normal mood  Neuro: Right facial droop. Right sided arm and leg weakness. Severe expressive aphasia. No response to threat from the right visual field. NIH 14. 2 for level of consciousness, 2 for visual fields, 2 for facial palsy, 2 for right arm, 2 for right leg, 2 for language, 2 for dysarthria.     DIAGNOSTIC STUDIES / PROCEDURES     EKG  Results for orders placed or performed during the hospital encounter of 21   EKG (NOW)   Result Value Ref Range    Report       [facility] Emergency Dept.    Test Date:  2021  Pt Name:    ELMER COOLEY                Department: ER  MRN:        [id number]                      Room:        12  Gender:     Male                         Technician: [id number]  :        1960                   Requested By:RADHA PAGAN  Order #:    [id number]                    Reading MD: Radha Pagan    Measurements  Intervals                                Axis  Rate:       98                           P:          74  MA:         180                          QRS:        57  QRSD:       78                           T:          83  QT:         396  QTc:        506    Interpretive Statements  SINUS RHYTHM  PROLONGED QT INTERVAL  No previous ECG available for comparison  Electronically Signed On 2021 22:56:25 PST by Radha Pagan          LABS  Labs Reviewed   CBC WITH DIFFERENTIAL - Abnormal; Notable for the following components:       Result Value    MCH 33.3 (*)     All other components within normal limits    Narrative:     Indicate which anticoagulants the patient is on:->UNKNOWN   PROTHROMBIN TIME - Abnormal; Notable for the following components:    PT 14.7 (*)     All other components within normal limits    Narrative:     Indicate which anticoagulants the patient is on:->UNKNOWN   HEMOGLOBIN A1C - Abnormal; Notable for the  following components:    Glycohemoglobin 5.9 (*)     All other components within normal limits   COMP METABOLIC PANEL    Narrative:     Indicate which anticoagulants the patient is on:->UNKNOWN   APTT    Narrative:     Indicate which anticoagulants the patient is on:->UNKNOWN   COD (ADULT)   TROPONIN    Narrative:     Indicate which anticoagulants the patient is on:->UNKNOWN   ABO RH CONFIRM   SARS-COV-2, PCR (IN-HOUSE)    Narrative:     Have you been in close contact with a person who is suspected  or known to be positive for COVID-19 within the last 30 days  (e.g. last seen that person < 30 days ago)->Unknown   ESTIMATED GFR    Narrative:     Indicate which anticoagulants the patient is on:->UNKNOWN   SARS-COV ANTIGEN KRISTEL    Narrative:     Have you been in close contact with a person who is suspected  or known to be positive for COVID-19 within the last 30 days  (e.g. last seen that person < 30 days ago)->Unknown   MAGNESIUM   LIPID PROFILE   CBC WITH DIFFERENTIAL   COMP METABOLIC PANEL   URINE DRUG SCREEN     All labs reviewed by me.    RADIOLOGY  DX-CHEST-PORTABLE (1 VIEW)   Final Result      No acute cardiopulmonary abnormality.      CT-CTA NECK WITH & W/O-POST PROCESSING   Final Result      1.  Occlusion of the right common carotid artery just distal to its origin related intraluminal thrombus which extends from that point to the carotid bifurcation.   2.  High-grade stenosis of both internal carotid arterial origins      CT-CTA HEAD WITH & W/O-POST PROCESS   Final Result      1.  Occlusion of the M1 segment of the left middle cerebral artery.   2.  Unremarkable CT angiogram of the head otherwise.   3.  No acute abnormality of the precontrast scan.      These findings were discussed with RADHA HUMPHRIES on 1/23/2021 at 2236 hours.      CT-CEREBRAL PERFUSION ANALYSIS   Final Result      1.  Cerebral blood flow less than 30% likely representing completed infarct = 17 mL.      2.  T Max more than 6 seconds likely  representing combination of completed infarct and ischemia = 138 mL.      3.  Mismatched volume likely representing ischemic brain/penumbra =  121 mL      4.  Please note that the cerebral perfusion was performed on the limited brain tissue around the basal ganglia region. Infarct/ischemia outside the CT perfusion sections can be missed in this study.      CT-HEAD W/O   Final Result      Normal CT scan of the head without contrast.               INTERPRETING LOCATION:  [address] [facility] [facility], [place], [address]      MR-BRAIN-W/O    (Results Pending)   EC-ECHOCARDIOGRAM COMPLETE W/O CONT    (Results Pending)   IR-THROMBO MECHANICAL ARTERY,INIT    (Results Pending)     The radiologist’s interpretation of all radiology studies have been reviewed by me.    COURSE & MEDICAL DECISION MAKING  Pertinent Labs & Imaging studies reviewed. (See chart for details)    10:08 PM Patient seen and examined at charge desk.     10:40 PM - Patient is found to have M1 occlusion.     10:43 PM - Called Hospitalist and Neurology.  Spoke with Dr. Cordero, neuro interventionalist, who will take the patient immediately to the neuro interventional lab for attempted thrombectomy.    10:50 PM - I discussed the patient's case and the above findings with Dr. Ballesteros (Neuro) who is aware of the patient's case.     10:53 PM - I discussed the patient's case and the above findings with Dr. Garcia (Hospitalist) who will assess the patient for hospitalization.    Medical Decision Making:  Patient presents with significant stroke symptoms with severe aphasia.  He has regained some use of his right upper extremity compared to the notes from the outside hospital.  He is outside of the window for TPA as last known well is at noon.  Patient was ordered for CTA, which demonstrates an acute occlusion that will be amenable to attempted thrombectomy.  The radiologist had already notified the neuro interventionalists, who then contacted me stating he would be taking the patient  for thrombectomy.    Patient will be admitted to the ICU.    Critical care time excluding other billable procedures: 37 minutes    DISPOSITION:  Patient will be hospitalized by Dr. Garcia in critical condition.      FINAL IMPRESSION  1. Cerebrovascular accident (CVA), unspecified mechanism (HCC)         Butch TARIQ (Scribe), am scribing for, and in the presence of, Travis Pagan M.D..    Electronically signed by: Butch Gómez (Julietteibrobb), 1/23/2021    Travis TARIQ M.D. personally performed the services described in this documentation, as scribed by Butch Gómez in my presence, and it is both accurate and complete. C.    The note accurately reflects work and decisions made by me.  Travis Pagan M.D.  1/24/2021  12:14 AM

## 2021-01-24 NOTE — OR SURGEON
Immediate Post- Operative Note        PostOp Diagnosis: L1 M1 thrombus and Left carotid stenosis       Procedure(s): Left carotid angioplasty and stent placement and thrombectomy.      Estimated Blood Loss: Less than 5 ml        Complications: None            1/24/2021     12:35 AM     Shant Kern M.D.

## 2021-01-24 NOTE — ASSESSMENT & PLAN NOTE
Borderline prediabetes with A1C of 5.9, he would benefit from a trial of outpatient lifestyle change prior to pharmacotherapy.

## 2021-01-24 NOTE — ASSESSMENT & PLAN NOTE
Complete occlusion of the common carotid artery seen with retrograde filling on CTA of the neck, generally no intravascular intervention indicated with complete occlusion.  Medical therapy with high-dose statin and antiplatelet therapy

## 2021-01-24 NOTE — ED NOTES
Pt back from CT. Received report on patient. Pt has obvious expressive aphasia unsure orientation. Rt side facial droop and Rt arm weakness.

## 2021-01-24 NOTE — ASSESSMENT & PLAN NOTE
CTA of the head/neck with occlusion of the M1 segment of the left MCA as well as occlusion of the right common carotid artery and high-grade stenosis of both internal carotid arteries  S/P successful mechanical thrombectomy of left M1 thrombus with TICI 2b flow on 1/24  S/P left carotid angioplasty with stent placement on 1/24  High intensity statin  Strict blood pressure control with goal SBP less than 160  Continue aspirin and Plavix  MRI brain

## 2021-01-24 NOTE — PROGRESS NOTES
Notified Dr. Lua that pt become unresponsive after moaning and had secretions at his mouth with possible spit up on the sheets. Pts mouth suctioned and placed on right side in case possible seizure. Orders for stat head CT

## 2021-01-24 NOTE — PROGRESS NOTES
Critical Care Progress Note    Date of admission  1/23/2021    Chief Complaint  60 y.o. male admitted 1/23/2021 with an acute ischemic stroke due to left M1 occlusion.  He underwent left MCA thrombectomy and left carotid artery angioplasty and stenting.    Hospital Course    1/24 -    continue blood pressure control.  Start supplemental vitamins and observe for evidence of EtOH withdrawal.  No evidence of EtOH withdrawal at this time.      Interval Problem Update  Reviewed last 24 hour events:      Acute stroke  No alteplase - out of window  Left M1 thrombectomy and left carotid stent  ETOH  HIgh dose thiamine and MVI  98.3  -850 mL of the last 24 and since admit      Review of Systems  Review of Systems   Unable to perform ROS: Acuity of condition        Vital Signs for last 24 hours   Temp:  [36.1 °C (96.9 °F)-36.8 °C (98.3 °F)] 36.7 °C (98 °F)  Pulse:  [] 65  Resp:  [12-32] 20  BP: ()/() 109/54  SpO2:  [94 %-100 %] 95 %    Hemodynamic parameters for last 24 hours       Respiratory Information for the last 24 hours       Physical Exam   Physical Exam  Constitutional:       Appearance: He is not diaphoretic.   HENT:      Head: Normocephalic and atraumatic.      Right Ear: External ear normal.      Left Ear: External ear normal.      Nose: Nose normal.      Mouth/Throat:      Mouth: Mucous membranes are moist.      Pharynx: Oropharynx is clear.   Eyes:      Conjunctiva/sclera: Conjunctivae normal.      Pupils: Pupils are equal, round, and reactive to light.   Neck:      Musculoskeletal: Normal range of motion and neck supple.   Cardiovascular:      Pulses: Normal pulses.      Heart sounds: No gallop.       Comments: Sinus rhythm  Pulmonary:      Breath sounds: No wheezing or rales.   Abdominal:      General: Bowel sounds are normal. There is no distension.      Palpations: Abdomen is soft.      Tenderness: There is no abdominal tenderness. There is no guarding.   Musculoskeletal: Normal range of  motion.      Right lower leg: No edema.      Left lower leg: No edema.      Comments: No clubbing or cyanosis   Skin:     Capillary Refill: Capillary refill takes less than 2 seconds.   Neurological:      Comments: Expressive aphasia.  Mild weakness in the right arm and right leg.         Medications  Current Facility-Administered Medications   Medication Dose Route Frequency Provider Last Rate Last Admin   • niCARdipine (CARDENE) 25 mg in  mL Infusion  0-15 mg/hr Intravenous Continuous Torey Benton M.D.   Stopped at 01/24/21 0030   • atorvastatin (LIPITOR) tablet 80 mg  80 mg Oral Q EVENING Oliverio Garcia M.D.       • [START ON 1/25/2021] aspirin EC (ECOTRIN) tablet 81 mg  81 mg Oral DAILY Oliverio Garcia M.D.       • [START ON 1/25/2021] clopidogrel (PLAVIX) tablet 75 mg  75 mg Oral DAILY Oliverio Garcia M.D.       • therapeutic multivitamin-minerals (THERAGRAN-M) tablet 1 Tab  1 Tab Oral DAILY Johan Lua M.D.   Stopped at 01/24/21 0600   • MD Alert...ICU Electrolyte Replacement per Pharmacy   Other PHARMACY TO DOSE Johan Lua M.D.       • folic acid (FOLVITE) tablet 1 mg  1 mg Oral DAILY Oliverio Garcia M.D.   1 mg at 01/24/21 1017   • thiamine (B-1) 500 mg in 5% dextrose 100 mL IVPB  500 mg Intravenous DAILY Torey Benton M.D. 200 mL/hr at 01/24/21 1156 500 mg at 01/24/21 1156    Followed by   • [START ON 1/27/2021] thiamine (Vitamin B-1) tablet 100 mg  100 mg Oral DAILY Torey Benton M.D.       • senna-docusate (PERICOLACE or SENOKOT S) 8.6-50 MG per tablet 2 Tab  2 Tab Oral BID Oliverio Garcia M.D.   Stopped at 01/24/21 0600    And   • polyethylene glycol/lytes (MIRALAX) PACKET 1 Packet  1 Packet Oral QDAY PRN Oliverio Garcia M.D.        And   • magnesium hydroxide (MILK OF MAGNESIA) suspension 30 mL  30 mL Oral QDAY PRN Oliverio Garcia M.D.        And   • bisacodyl (DULCOLAX) suppository 10 mg  10 mg Rectal QDAY PRN Oliverio Garcia M.D.            Fluids    Intake/Output Summary (Last 24 hours) at 1/24/2021 1619  Last data filed at 1/24/2021 1400  Gross per 24 hour   Intake 1013.33 ml   Output 985 ml   Net 28.33 ml       Laboratory          Recent Labs     01/23/21 2207 01/24/21 0210   SODIUM 138 134*   POTASSIUM 4.2 4.7   CHLORIDE 103 102   CO2 22 19*   BUN 11 10   CREATININE 0.88 0.90   MAGNESIUM 2.4  --    CALCIUM 9.2 7.6*     Recent Labs     01/23/21 2207 01/24/21 0210   ALTSGPT 28 22   ASTSGOT 28 18   ALKPHOSPHAT 63 50   TBILIRUBIN 0.4 0.5   GLUCOSE 88 96     Recent Labs     01/23/21 2207 01/24/21 0210   WBC 6.9 7.2   NEUTSPOLYS 63.40 76.10*   LYMPHOCYTES 24.10 14.80*   MONOCYTES 10.90 7.90   EOSINOPHILS 0.70 0.40   BASOPHILS 0.60 0.40   ASTSGOT 28 18   ALTSGPT 28 22   ALKPHOSPHAT 63 50   TBILIRUBIN 0.4 0.5     Recent Labs     01/23/21 2207 01/24/21 0210   RBC 4.99 4.07*   HEMOGLOBIN 16.6 13.7*   HEMATOCRIT 48.8 40.3*   PLATELETCT 186 160*   PROTHROMBTM 14.7*  --    APTT 26.1  --    INR 1.11  --        Imaging  None    Assessment/Plan  * Cerebrovascular accident (CVA) due to occlusion of left middle cerebral artery (HCC)  Assessment & Plan  CTA of the head/neck with occlusion of the M1 segment of the left MCA as well as occlusion of the right common carotid artery and high-grade stenosis of both internal carotid arteries  S/P successful mechanical thrombectomy of left M1 thrombus with TICI 2b flow on 1/24  S/P left carotid angioplasty with stent placement on 1/24  High intensity statin  Strict blood pressure control with goal SBP less than 160  Continue aspirin and Plavix  MRI brain    Internal carotid artery stenosis, left  Assessment & Plan  S/P left carotid angioplasty with stent placement on 1/24    Carotid occlusion, right  Assessment & Plan       Tobacco abuse  Assessment & Plan  Cessation counseling when clinically appropriate    Alcohol abuse  Assessment & Plan  High-dose IV thiamine and supplemental vitamins  Observe for evidence  of withdrawal  Cessation counseling when clinically appropriate       VTE:  Contraindicated  Ulcer: Not Indicated  Lines: None    I have performed a physical exam and reviewed and updated ROS and Plan today (1/24/2021). In review of yesterday's note (1/23/2021), there are no changes except as documented above.     I have assessed and reassessed his respiratory status, blood pressure, hemodynamics, cardiovascular status as well as his neurologic status.  He is at increased risk for worsening CNS system dysfunction.    Discussed patient condition and risk of morbidity and/or mortality with RN, RT, Pharmacy, [facility] resident, Charge nurse / hot rounds and QA team     The patient remains critically ill.  Critical care time = 20 minutes in directly providing and coordinating critical care and extensive data review.  No time overlap and excludes procedures.    The time spent is in addition to the time spent by Dr. Lua earlier today.    Torey Benton MD  Pulmonary and Critical Care Medicine

## 2021-01-24 NOTE — ED TRIAGE NOTES
"Chief Complaint   Patient presents with   • Possible Stroke     Stroke Pre Alert Time: 1859  Deficits PTA: R facial droop, RUE weakness, expressive aphasia  Door Time: 2200  On set or Last Seen Normal: 1200  A&O on Arrival: CASS  GCS: 14    EMS flight transfer from [place] as stroke IR. Per EMS report pt was LKW at 1200 today presented to [place] at approx. 1800 with expressive aphasia, R sided facial droop, RUE weakness and drift. Pt arrives with same deficits, NIH 14 per ERP. COVID - PTA.    BP (!) 188/105   Pulse (!) 101   Temp 36.8 °C (98.3 °F) (Temporal)   Resp (!) 24   Ht 1.88 m (6' 2\")   Wt 70 kg (154 lb 5.2 oz)   SpO2 99%   BMI 19.81 kg/m²     "

## 2021-01-24 NOTE — ASSESSMENT & PLAN NOTE
High-dose IV thiamine and supplemental vitamins  Observe for evidence of withdrawal  Cessation counseling when clinically appropriate

## 2021-01-24 NOTE — ASSESSMENT & PLAN NOTE
Status post stenting for 70% with symptomatic carotid disease in the affected vessel distribution  DAPT for 6 months, follow-up outpatient with NeuroIR

## 2021-01-24 NOTE — CONSULTS
Neurology STROKE CODE H&P  Neurohospitalist Service, [facility]    Referring Physician: Oliverio Garcia M.D.    STROKE CODE:   Chief Complaint   Patient presents with   • Possible Stroke       To obtain the most accurate data regarding the time called, and time patient seen, refer to the stroke run-sheet and chart.  For time of CT, refer to the radiology report. See A&P below for TPA Decision and door to needle time if and when applicable.    HPI: 60-year-old male history obtained from chart only.  Patient is unable to give any history.    Last known well at noon today.    Phone call around 5:30 PM with a friend.  Noticed slurred speech.  Taken to an outside facility.  CT head was reported unremarkable.  Was transferred here.      CTA head and neck shows left MCA thrombus with severe left ICA stenosis 70%.  Right common is occluded.      Review of systems: In addition to what is detailed in the HPI above, (and scanned into the chart if and when applicable), all other systems reviewed and are negative.    Past Medical History:    has no past medical history on file.  Unknown    FHx:  family history is not on file.  Unknown  SHx:       Allergies:  Not on File    Medications:    Current Facility-Administered Medications:   •  Pharmacy consult request - Allow for permissive hypertension: SBP up to 220 mmHg/DBP up to 120 mmHg x 48 hours, , Other, PHARMACY TO DOSE, Oliverio Garcia M.D.  •  senna-docusate (PERICOLACE or SENOKOT S) 8.6-50 MG per tablet 2 Tab, 2 Tab, Oral, BID **AND** polyethylene glycol/lytes (MIRALAX) PACKET 1 Packet, 1 Packet, Oral, QDAY PRN **AND** magnesium hydroxide (MILK OF MAGNESIA) suspension 30 mL, 30 mL, Oral, QDAY PRN **AND** bisacodyl (DULCOLAX) suppository 10 mg, 10 mg, Rectal, QDAY PRN, Oliverio Garcia M.D.  •  [START ON 1/24/2021] aspirin (ASA) tablet 325 mg, 325 mg, Oral, DAILY **OR** [START ON 1/24/2021] aspirin (ASA) chewable tab 324 mg, 324 mg, Oral, DAILY **OR**  "[START ON 1/24/2021] aspirin (ASA) suppository 300 mg, 300 mg, Rectal, DAILY, Oliverio Garcia M.D.  No current outpatient medications on file.    Physical Examination:    Vitals:    01/23/21 2200 01/23/21 2228 01/23/21 2300   BP: (!) 172/98 (!) 188/105 (!) 168/96   Pulse: 98 (!) 101 94   Resp: 15 (!) 24 20   Temp: 36.8 °C (98.3 °F)     TempSrc: Temporal     SpO2: 99% 99% 97%   Weight: 70 kg (154 lb 5.2 oz)     Height: 1.88 m (6' 2\")         General: Patient is awake and in no acute distress  Eyes: examination of optic disks not indicated at this time  CV: RRR    NEUROLOGICAL EXAM:     Mental status: Awake but not following commands.    Speech and language: Patient speaks in gibberish.  Does not follow commands.  Will not mimic me.  Cranial nerve exam: Pupils are equal, round and reactive to light bilaterally.  Blinks to threat bilaterally extraocular muscles are intact.  Severe right facial droop decree sensation right side.. Hearing to finger rub equal. Palate elevates symmetrically. Shoulder shrug is full. Tongue is midline.  Motor exam: Strength left side appears to be normal right side appears to be 4-4+ arm and leg.  Sensory exam: Left skin was seen to noxious stimuli on right side  Deep tendon reflexes: Upgoing toe on the right downgoing on the left  Coordination: no ataxia   Gait: deferred due to severe confusion stroke alert.    NIH Stroke Scale:    1a. Level of Consciousness (Alert, drowsy, etc): 0= Alert    1b. LOC Questions (Month, age): 2= Incorrect    1c. LOC Commands (Open/close eyes make fist/let go): 2= Incorrect    2.   Best Gaze (Eyes open - patient follows examiner's finger on face): 0= Normal    3.   Visual Fields (introduce visual stimulus/threat to patient's field quadrants): 0= No visual loss  4.   Facial Paresis (Show teeth, raise eyebrows and squeeze eyes shut): 2 = Partial     5a. Motor Arm - Left (Elevate arm to 90 degrees if patient is sitting, 45 degrees if  supine): 0= No drift    5b. " Motor Arm - Right (Elevate arm to 90 degrees if patient is sitting, 45 degrees if supine): 0= No drift    6a. Motor Leg - Left (Elevate leg 30 degrees with patient supine): 0= No drift    6b. Motor Leg - Right  (Elevate leg 30 degrees with patient supine): 0= No drift    7.   Limb Ataxia (Finger-nose, heel down shin): 0= No ataxia    8.   Sensory (Pin prick to face, arm, trunk and leg - compare side to side): 1= Partial loss    9.  Best Language (Name item, describe a picture and read sentences): 2= Severe aphasia    10. Dysarthria (Evaluate speech clarity by patient repeating listed words): 2= Near to unintelligible or worse    11. Extinction and Inattention (Use information from prior testing to identify neglect or  double simultaneous stimuli testing): 0= No neglect    Total NIH Score: 11    Modified Sutter Scale (MRS): 0 = No symptoms      Objective Data:    Labs:  Lab Results   Component Value Date/Time    PROTHROMBTM 14.7 (H) 01/23/2021 10:07 PM    INR 1.11 01/23/2021 10:07 PM      Lab Results   Component Value Date/Time    WBC 6.9 01/23/2021 10:07 PM    RBC 4.99 01/23/2021 10:07 PM    HEMOGLOBIN 16.6 01/23/2021 10:07 PM    HEMATOCRIT 48.8 01/23/2021 10:07 PM    MCV 97.8 01/23/2021 10:07 PM    MCH 33.3 (H) 01/23/2021 10:07 PM    MCHC 34.0 01/23/2021 10:07 PM    MPV 10.0 01/23/2021 10:07 PM    NEUTSPOLYS 63.40 01/23/2021 10:07 PM    LYMPHOCYTES 24.10 01/23/2021 10:07 PM    MONOCYTES 10.90 01/23/2021 10:07 PM    EOSINOPHILS 0.70 01/23/2021 10:07 PM    BASOPHILS 0.60 01/23/2021 10:07 PM      Lab Results   Component Value Date/Time    SODIUM 138 01/23/2021 10:07 PM    POTASSIUM 4.2 01/23/2021 10:07 PM    CHLORIDE 103 01/23/2021 10:07 PM    CO2 22 01/23/2021 10:07 PM    GLUCOSE 88 01/23/2021 10:07 PM    BUN 11 01/23/2021 10:07 PM    CREATININE 0.88 01/23/2021 10:07 PM      No results found for: CHOLSTRLTOT, LDL, HDL, TRIGLYCERIDE    Lab Results   Component Value Date/Time    ALKPHOSPHAT 63 01/23/2021 10:07 PM     ASTSGOT 28 01/23/2021 10:07 PM    ALTSGPT 28 01/23/2021 10:07 PM    TBILIRUBIN 0.4 01/23/2021 10:07 PM        Imaging/Testing:  DX-CHEST-PORTABLE (1 VIEW)   Final Result      No acute cardiopulmonary abnormality.      CT-CTA NECK WITH & W/O-POST PROCESSING   Final Result      1.  Occlusion of the right common carotid artery just distal to its origin related intraluminal thrombus which extends from that point to the carotid bifurcation.   2.  High-grade stenosis of both internal carotid arterial origins      CT-CTA HEAD WITH & W/O-POST PROCESS   Final Result      1.  Occlusion of the M1 segment of the left middle cerebral artery.   2.  Unremarkable CT angiogram of the head otherwise.   3.  No acute abnormality of the precontrast scan.      These findings were discussed with RADHA HUMPHRIES on 1/23/2021 at 2236 hours.      CT-CEREBRAL PERFUSION ANALYSIS   Final Result      1.  Cerebral blood flow less than 30% likely representing completed infarct = 17 mL.      2.  T Max more than 6 seconds likely representing combination of completed infarct and ischemia = 138 mL.      3.  Mismatched volume likely representing ischemic brain/penumbra =  121 mL      4.  Please note that the cerebral perfusion was performed on the limited brain tissue around the basal ganglia region. Infarct/ischemia outside the CT perfusion sections can be missed in this study.      CT-HEAD W/O   Final Result      Normal CT scan of the head without contrast.               INTERPRETING LOCATION:  [address] [place] [address], [id number]      MR-BRAIN-W/O    (Results Pending)       Assessment and Plan:    60-year-old male transferred from outside facility found to have a left M1 occlusion along with bilateral severe carotid stenosis including a right common carotid occlusion.  He is not a TPA candidate due to outside window.  Last known well is noon today.  Etiology is unknown but could be vessel to vessel thrombus due to the left ICA stenosis.  I spoke to Dr. Gil WORKMAN  about his clinical findings that he is severely global aphasic in correlation with the CTA perfusion findings showing a large penumbra.  Dr. Cordero agreed to take the patient to IR.  For M1 thrombectomy with possibility of stenting the left ICA.    Plan:  1.  Admit to ICU after IR  2.  Blood pressure per guidelines below based on TICI score  3.  Decision to use Integrilin infusion will be made by Dr. Cordero after procedure.  Protocol per Dr. Cordero and ICU team.  4.  Q neurochecks per protocol  5.  MRI brain  6.  Telemonitoring  7.  Stat CT head if change in neuro status  8.  Lipid panel start high intensity statin when able for an LDL goal below 70  9.  Glucose control per primary recommendations normoglycemia  10.  TTE with bubble  11.  PT/OT/speech    After the endovascular intervention, please follow the following recommendations regarding blood pressure parameters:    If TICI 3: maintain systolic BP < 140  If TICI 2b: maintain systolic BP < 160  If TICI 2a or less, maintain systolic BP < 180 per tPA protocol    This is in an effort to minimize reperfusion injury and/or hemorrhagic conversion      The evaluation of the patient, and recommended management, was discussed with the resident staff.     This chart was partially generated using voice recognition technology and sound alike word replacement may be present, best efforts were made to make the chart accurate.    Johan De MD  Board Certified Neurology, ABPN  (t) [phone]

## 2021-01-24 NOTE — PROGRESS NOTES
Status post left carotid stent placement and mechanical thrombectomy .    Post procedural CT:Small areas of evolving infarct with hyperperfusion in the left insular region.    There is no large infarct .        There is no new neuro deficit after the procedure.    Neuro interventional recommendations:  Dual antiplatelet treatments for 6 months  Neurointerventional clinic visit -Followup after 3 months

## 2021-01-24 NOTE — PROGRESS NOTES
"[facility] Progress Note      Admit Date: 1/23/2021    Resident(s): Zach Shen M.D.   Attending:  TUTU ARIAS/ Dr. Benton    Patient ID:    Name:  Sánchez Leija   YOB: 1960  Age:  60 y.o.  male   MRN:  [id number]    Hospital Course (carried forward and updated):  Sánchez Leija is a 60 y.o. male with a past medical history of heavy alcohol tobacco use who presented on 1/23/2021 from an outside facility with complaints of right-sided weakness, right facial droop, and severe aphasia.  He did not receive TPA prior to or after arrival, however subsequent imaging was notable for a left M1 occlusion, as well as 70% left internal carotid stenosis and complete right common carotid occlusion with retrograde filling.  He was evaluated by neurology and neuro interventional radiology, and subsequently underwent mechanical thrombectomy and placement of a left carotid stent. He was then started on an Integrilin drip and transferred to the intensive care unit for close monitoring.  He was then loaded with dual antiplatelet therapy and the Integrilin drip was stopped.  Physical, occupational, and speech therapies were consulted.    Consultants:  Critical Care  Neurology     Interval Events:  Patient had a events of bradycardia, hypotension, and apparent altered mental status overnight.  He received multiple boluses as well as atropine.  He was evaluated by the on-call intensivist who did not think it likely that he had a seizure, head CT was repeated and reassuringly did not show any bleed.  He does have severe global aphasia which does limit his capacity to communicate.  As of this morning he is doing quite well, did require a small bolus for hypotension however was subsequently normotensive.  He was not able to demonstrate understanding to verbal commands initially, and was not able to speak, however subsequently per nursing he was able to express that \"this is bitter\" when attempting to swallow a " "pill.  He was also able to mimic movements when they were demonstrated for him.  He was evaluated by speech and started on a diet.  I called his father Joss and updated him on the situation, he was thankful for the care he has been provided and will call nursing daily to follow-up.    Review of Systems   Unable to perform ROS: Medical condition       PHYSICAL EXAM:  Vitals:    01/24/21 1100 01/24/21 1130 01/24/21 1200 01/24/21 1300   BP: 100/58 (!) 84/47 (!) 93/51 121/56   Pulse: 63 63 61 74   Resp: 17 17 15 18   Temp:   36.7 °C (98 °F)    TempSrc:   Temporal    SpO2: 95% 97% 99% 99%   Weight:       Height:        Body mass index is 20.23 kg/m².  Latest Vitals:  /56   Pulse 74   Temp 36.7 °C (98 °F) (Temporal)   Resp 18   Ht 1.88 m (6' 2.02\")   Wt 71.5 kg (157 lb 10.1 oz)   SpO2 99%   BMI 20.23 kg/m²   O2 therapy: Pulse Oximetry: 99 %, O2 (LPM): 1, O2 Delivery Device: None - Room Air  Vitals Range last 24h:  Temp:  [36.1 °C (96.9 °F)-36.8 °C (98.3 °F)] 36.7 °C (98 °F)  Pulse:  [] 74  Resp:  [12-32] 18  BP: ()/() 121/56  SpO2:  [94 %-100 %] 99 %  Date 01/24/21 0700 - 01/25/21 0659   Shift 8041-4673 7318-9906 7026-6652 24 Hour Total   INTAKE   Shift Total       OUTPUT   Urine 135   135     Output (mL) (Urethral Catheter Latex) 135   135   Stool         Number of Times Stooled 1 x   1 x   Shift Total 135   135   NET -135   -135        Intake/Output Summary (Last 24 hours) at 1/24/2021 1338  Last data filed at 1/24/2021 1100  Gross per 24 hour   Intake --   Output 985 ml   Net -985 ml        Physical Exam   Constitutional: He is well-developed, well-nourished, and in no distress.   HENT:   Head: Normocephalic and atraumatic.   Eyes: Pupils are equal, round, and reactive to light. EOM are normal.   Cardiovascular: Normal rate and regular rhythm. Exam reveals no gallop.   No murmur heard.  Pulmonary/Chest: Effort normal and breath sounds normal. He has no wheezes. He has no rales. "   Abdominal: Soft. Bowel sounds are normal. He exhibits no distension. There is no abdominal tenderness.   Musculoskeletal:         General: No edema.   Neurological:   Severe global aphasia - not able to follow verbal or written commands  Unable to assess orientation due to above  Moving all 4 extremities, able to mimic movements and therefore able to demonstrate strength, appears to have 5 out of 5 strength in all extremities  Right facial droop, otherwise limited assessment of cranial nerves II through XII appears intact   Skin: Skin is warm and dry.           Recent Labs     01/23/21 2207 01/24/21 0210   SODIUM 138 134*   POTASSIUM 4.2 4.7   CHLORIDE 103 102   CO2 22 19*   BUN 11 10   CREATININE 0.88 0.90   MAGNESIUM 2.4  --    CALCIUM 9.2 7.6*     Recent Labs     01/23/21 2207 01/24/21 0210   ALTSGPT 28 22   ASTSGOT 28 18   ALKPHOSPHAT 63 50   TBILIRUBIN 0.4 0.5   GLUCOSE 88 96     Recent Labs     01/23/21 2207 01/24/21 0210   RBC 4.99 4.07*   HEMOGLOBIN 16.6 13.7*   HEMATOCRIT 48.8 40.3*   PLATELETCT 186 160*   PROTHROMBTM 14.7*  --    APTT 26.1  --    INR 1.11  --      Recent Labs     01/23/21 2207 01/24/21 0210   WBC 6.9 7.2   NEUTSPOLYS 63.40 76.10*   LYMPHOCYTES 24.10 14.80*   MONOCYTES 10.90 7.90   EOSINOPHILS 0.70 0.40   BASOPHILS 0.60 0.40   ASTSGOT 28 18   ALTSGPT 28 22   ALKPHOSPHAT 63 50   TBILIRUBIN 0.4 0.5       Meds:  • niCARdipine infusion  0-15 mg/hr Stopped (01/24/21 0030)   • atorvastatin  80 mg     • [START ON 1/25/2021] aspirin EC  81 mg     • [START ON 1/25/2021] clopidogrel  75 mg     • therapeutic multivitamin-minerals  1 Tab     • MD Alert...Adult ICU Electrolyte Replacement per Pharmacy       • folic acid  1 mg     • thiamine  500 mg 500 mg (01/24/21 1156)    Followed by   • [START ON 1/27/2021] thiamine  100 mg     • senna-docusate  2 Tab      And   • polyethylene glycol/lytes  1 Packet      And   • magnesium hydroxide  30 mL      And   • bisacodyl  10 mg     • eptifibatide  0.75 mg/mL  2 mcg/kg/min 2 mcg/kg/min (01/24/21 0758)        Procedures:      Imaging:  EC-ECHOCARDIOGRAM COMPLETE W/O CONT         CT-HEAD W/O   Final Result      Findings consistent with early changes of left MCA territory infarction.               INTERPRETING LOCATION:  [address], [place], [address]      IR-THROMBO MECHANICAL ARTERY,INIT   Final Result      1.Occluded right common carotid artery.   *  No intervention was performed.   2.Severe stenosis at the origin of left internal carotid artery.   *  Successful left carotid angioplasty and stent placement with embolic protection device.   3.Left M1 segment thrombus.   *  Successful mechanical thrombectomy   *  TICI 2b flow.      DX-CHEST-PORTABLE (1 VIEW)   Final Result      No acute cardiopulmonary abnormality.      CT-CTA NECK WITH & W/O-POST PROCESSING   Final Result      1.  Occlusion of the right common carotid artery just distal to its origin related intraluminal thrombus which extends from that point to the carotid bifurcation.   2.  High-grade stenosis of both internal carotid arterial origins      CT-CTA HEAD WITH & W/O-POST PROCESS   Final Result      1.  Occlusion of the M1 segment of the left middle cerebral artery.   2.  Unremarkable CT angiogram of the head otherwise.   3.  No acute abnormality of the precontrast scan.      These findings were discussed with RADHA HUMPHRIES on 1/23/2021 at 2236 hours.      CT-CEREBRAL PERFUSION ANALYSIS   Final Result      1.  Cerebral blood flow less than 30% likely representing completed infarct = 17 mL.      2.  T Max more than 6 seconds likely representing combination of completed infarct and ischemia = 138 mL.      3.  Mismatched volume likely representing ischemic brain/penumbra =  121 mL      4.  Please note that the cerebral perfusion was performed on the limited brain tissue around the basal ganglia region. Infarct/ischemia outside the CT perfusion sections can be missed in this study.      CT-HEAD W/O    Final Result      Normal CT scan of the head without contrast.               INTERPRETING LOCATION:  [address] [facility], [place], [address]      MR-BRAIN-W/O    (Results Pending)   MR-MRA HEAD-W/O    (Results Pending)       Problem and Plan:    * Cerebrovascular accident (CVA) due to occlusion of left middle cerebral artery (HCC)  Assessment & Plan  Mr. Leija presented on 1/23/2021 with complaints of right-sided facial droop and aphasia from an outside facility.  He was found to have a left M1 thrombus and underwent mechanical thrombectomy which was successful.  Additionally he was found to have left-sided carotid stenosis of 70% for which a stent was placed.  He was then started on an Integrilin drip and transferred to the intensive care unit.  He was also noted to have right common carotid artery occlusion with retrograde filling. He had some post-intervention bradycardia and hypotension likely secondary to carotid hypersensitivity after intervention. Due to timing he did not receive TPA.  -Frequent neurochecks Q1H  -Follow neurology recommendations  -Integrilin drip until 2PM 1/24/2021, loaded with plavix and aspirin 4 hours prior to end of drip followed by daily DAPT for 6 months  -Atorvastatin 80mg daily  -Outpatient neuroIR follow-up  -Continue with nicardipine, gtt goal < 140   -Telemetry monitoring   -MRI brain  -Echocardiogram with bubble  -PT/OT/SLP, may benefit from inpatient rehab. PMR consult pending  -Outpatient tobacco and alcohol cessation        Internal carotid artery stenosis, left  Assessment & Plan  Status post stenting for 70% with symptomatic carotid disease in the affected vessel distribution  DAPT for 6 months, follow-up outpatient with NeuroIR        Carotid occlusion, right  Assessment & Plan  Complete occlusion of the common carotid artery seen with retrograde filling on CTA of the neck, generally no intravascular intervention indicated with complete occlusion.  Medical therapy with high-dose  statin and antiplatelet therapy    Tobacco abuse  Assessment & Plan   on cessation and hazards associated with continued use.     Alcohol abuse  Assessment & Plan  Per his family, high quantities of alcohol intake, approximately 4-6 sixpacks of beer daily.  Monitor for withdrawal  Check electrolytes magnesium and phosphorus   High dose IV thiamine/MVI/folate      DISPO: ICU    CODE STATUS: Full    Quality Measures:  Feeding: PO diet per speech  Analgesia: N/A  Sedation: N/A  Thromboprophylaxis: SCD - on DAPT  Head of bed: >30 degrees  Ulcer prophylaxis: N/A  Glycemic control: N/A  Bowel care: bowel regimen  Indwelling lines: PIV  Deescalation of antibiotics: N/A      Zach Shen M.D.

## 2021-01-24 NOTE — PROGRESS NOTES
IR RN note    Patient underwent a Mechanical thrombectomy with interventions by Dr. Kern.  Procedure site was verified by MD using imaging guidance. Consent was signed.  JI Simmons and Sunita assisted. Patient was placed in a supine position.  Vitals were taken every 5 minutes and remained stable during procedure (see doc flow sheet for results).  CO2 waveform capnography was monitored throughout procedure, see chart.  Right groin access site.   An angio Seal, gauze and tegaderm dressing was placed over surgical site. Report given to Amirah MCKEON. Pt transported by sera with RN to RICU, with monitor .   Reviewed sedation orders with MD BILLS procedure ended at 0027      AngioSeal 8F @ 00:25  REF # [id number]  LOT # [id number]    Left Carotid Stent (Left MCA)  XACT Carotid stent system  8-6 mm X 40 mm   Ref# [id number]  Lot [id number]

## 2021-01-24 NOTE — CONSULTS
Critical Care Consultation    Date of consult: 1/24/2021    Referring Physician  Oliverio Garcia M.D.    Reason for Consultation  CVA    History of Presenting Illness  60 y.o. male who presented 1/23/2021 with CVA secondary to L M1 occlusion and ICA stenosis. He was speaking to a friend at 5:30 pm over the phone when he suddenly stopped talking. His friend drove to the house and found him on the floor. [facility]. [facility] head/neck significant for Left M1 Occlusion and  high-grade, approximate 70% stenosis of the left internal carotid with approximately 50-70% stenosis of the right internal carotid. Neurologist, Dr. García, was consulted who discussed with interventional radiology for thrombectomy. Dr. Cordero preformed Left MCA thrombectomy and L carotid angioplasty with stenting.     The patient has expressive aphasia, no further history available at this time.     Code Status  Full Code    Review of Systems  Review of Systems   Unable to perform ROS: Patient nonverbal       Past Medical History   has no past medical history on file.    Surgical History   has no past surgical history on file.    Family History  family history is not on file.    Social History       Medications  Home Medications    **Home medications have not yet been reviewed for this encounter**       Current Facility-Administered Medications   Medication Dose Route Frequency Provider Last Rate Last Admin   • niCARdipine (CARDENE) 25 mg in  mL Infusion  0-15 mg/hr Intravenous Continuous Shant Kern M.D.   Stopped at 01/24/21 0030   • atorvastatin (LIPITOR) tablet 80 mg  80 mg Oral Q EVENING Oliverio Garcia M.D.       • aspirin (ASA) tablet 325 mg  325 mg Oral Once Oliverio Garcia M.D.       • clopidogrel (PLAVIX) tablet 300 mg  300 mg Oral Once Oliverio Garcia M.D.       • [START ON 1/25/2021] aspirin EC (ECOTRIN) tablet 81 mg  81 mg Oral DAILY Oliverio Garcia M.D.       • [START ON 1/25/2021]  clopidogrel (PLAVIX) tablet 75 mg  75 mg Oral DAILY Oliverio Garcia M.D.       • senna-docusate (PERICOLACE or SENOKOT S) 8.6-50 MG per tablet 2 Tab  2 Tab Oral BID Oliverio Garcia M.D.        And   • polyethylene glycol/lytes (MIRALAX) PACKET 1 Packet  1 Packet Oral QDAY PRN Oliverio Garcia M.D.        And   • magnesium hydroxide (MILK OF MAGNESIA) suspension 30 mL  30 mL Oral QDAY PRN Oliverio Garcia M.D.        And   • bisacodyl (DULCOLAX) suppository 10 mg  10 mg Rectal QDAY PRN Oliverio Garcia M.D.       • eptifibatide 0.75 mg/mL (INTEGRILIN) infusion  2 mcg/kg/min Intravenous Continuous Oliverio Garcia M.D. 11.2 mL/hr at 01/24/21 0100 2 mcg/kg/min at 01/24/21 0100       Allergies  Not on File    Vital Signs last 24 hours  Temp:  [36.1 °C (96.9 °F)-36.8 °C (98.3 °F)] 36.1 °C (96.9 °F)  Pulse:  [] 61  Resp:  [12-24] 18  BP: ()/() 85/61  SpO2:  [94 %-100 %] 100 %    Physical Exam  Physical Exam  Vitals signs and nursing note reviewed.   Constitutional:       Appearance: He is ill-appearing.   HENT:      Head: Normocephalic and atraumatic.      Right Ear: External ear normal.      Left Ear: External ear normal.      Nose: Nose normal.      Mouth/Throat:      Mouth: Mucous membranes are moist.   Eyes:      Pupils: Pupils are equal, round, and reactive to light.   Neck:      Musculoskeletal: No neck rigidity.   Cardiovascular:      Rate and Rhythm: Normal rate and regular rhythm.      Pulses: Normal pulses.      Heart sounds: No murmur. No gallop.    Pulmonary:      Effort: Pulmonary effort is normal. No respiratory distress.      Breath sounds: No wheezing or rales.   Abdominal:      General: Abdomen is flat. There is no distension.      Tenderness: There is no abdominal tenderness. There is no guarding or rebound.   Musculoskeletal:      Right lower leg: No edema.      Left lower leg: No edema.      Comments: R groin dressing c/d/i   Lymphadenopathy:      Cervical: No cervical adenopathy.    Skin:     General: Skin is warm and dry.      Capillary Refill: Capillary refill takes less than 2 seconds.   Neurological:      Mental Status: He is alert.      Comments: Dense expressive aphasia. Mild R facial droop. Normal strength LUE/LLE. 3-4/5 RUE/RLE         Fluids  No intake or output data in the 24 hours ending 01/24/21 0332    Laboratory  Recent Results (from the past 48 hour(s))   CBC WITH DIFFERENTIAL    Collection Time: 01/23/21 10:07 PM   Result Value Ref Range    WBC 6.9 4.8 - 10.8 K/uL    RBC 4.99 4.70 - 6.10 M/uL    Hemoglobin 16.6 14.0 - 18.0 g/dL    Hematocrit 48.8 42.0 - 52.0 %    MCV 97.8 81.4 - 97.8 fL    MCH 33.3 (H) 27.0 - 33.0 pg    MCHC 34.0 33.7 - 35.3 g/dL    RDW 47.1 35.9 - 50.0 fL    Platelet Count 186 164 - 446 K/uL    MPV 10.0 9.0 - 12.9 fL    Neutrophils-Polys 63.40 44.00 - 72.00 %    Lymphocytes 24.10 22.00 - 41.00 %    Monocytes 10.90 0.00 - 13.40 %    Eosinophils 0.70 0.00 - 6.90 %    Basophils 0.60 0.00 - 1.80 %    Immature Granulocytes 0.30 0.00 - 0.90 %    Nucleated RBC 0.00 /100 WBC    Neutrophils (Absolute) 4.35 1.82 - 7.42 K/uL    Lymphs (Absolute) 1.65 1.00 - 4.80 K/uL    Monos (Absolute) 0.75 0.00 - 0.85 K/uL    Eos (Absolute) 0.05 0.00 - 0.51 K/uL    Baso (Absolute) 0.04 0.00 - 0.12 K/uL    Immature Granulocytes (abs) 0.02 0.00 - 0.11 K/uL    NRBC (Absolute) 0.00 K/uL   COMP METABOLIC PANEL    Collection Time: 01/23/21 10:07 PM   Result Value Ref Range    Sodium 138 135 - 145 mmol/L    Potassium 4.2 3.6 - 5.5 mmol/L    Chloride 103 96 - 112 mmol/L    Co2 22 20 - 33 mmol/L    Anion Gap 13.0 7.0 - 16.0    Glucose 88 65 - 99 mg/dL    Bun 11 8 - 22 mg/dL    Creatinine 0.88 0.50 - 1.40 mg/dL    Calcium 9.2 8.5 - 10.5 mg/dL    AST(SGOT) 28 12 - 45 U/L    ALT(SGPT) 28 2 - 50 U/L    Alkaline Phosphatase 63 30 - 99 U/L    Total Bilirubin 0.4 0.1 - 1.5 mg/dL    Albumin 4.7 3.2 - 4.9 g/dL    Total Protein 7.7 6.0 - 8.2 g/dL    Globulin 3.0 1.9 - 3.5 g/dL    A-G Ratio 1.6 g/dL    PROTHROMBIN TIME    Collection Time: 21 10:07 PM   Result Value Ref Range    PT 14.7 (H) 12.0 - 14.6 sec    INR 1.11 0.87 - 1.13   APTT    Collection Time: 21 10:07 PM   Result Value Ref Range    APTT 26.1 24.7 - 36.0 sec   COD (ADULT)    Collection Time: 21 10:07 PM   Result Value Ref Range    ABO Grouping Only A     Rh Grouping Only NEG     Antibody Screen-Cod NEG    TROPONIN    Collection Time: 21 10:07 PM   Result Value Ref Range    Troponin T 12 6 - 19 ng/L   ESTIMATED GFR    Collection Time: 21 10:07 PM   Result Value Ref Range    GFR If African American >60 >60 mL/min/1.73 m 2    GFR If Non African American >60 >60 mL/min/1.73 m 2   Hemoglobin A1C    Collection Time: 21 10:07 PM   Result Value Ref Range    Glycohemoglobin 5.9 (H) 0.0 - 5.6 %    Est Avg Glucose 123 mg/dL   Magnesium    Collection Time: 21 10:07 PM   Result Value Ref Range    Magnesium 2.4 1.5 - 2.5 mg/dL   EKG (NOW)    Collection Time: 21 10:30 PM   Result Value Ref Range    Report       [facility]    Test Date:  2021  Pt Name:    ELMER COOLEY                Department: ER  MRN:        [id number]                      Room:       [facility]  Gender:     Male                         Technician: [id number]  :        1960                   Requested By:RADHA PAGAN  Order #:    [id number]                    Reading MD: Radha Pagan    Measurements  Intervals                                Axis  Rate:       98                           P:          74  VT:         180                          QRS:        57  QRSD:       78                           T:          83  QT:         396  QTc:        506    Interpretive Statements  SINUS RHYTHM  PROLONGED QT INTERVAL  No previous ECG available for comparison  Electronically Signed On 2021 22:56:25 PST by Radha Pagan     ABO Rh Confirm    Collection Time: 21 10:44 PM   Result Value Ref Range    ABO Rh Confirm A  NEG    SARS-CoV-2, PCR (In-House): Collect NP swab in VTM    Collection Time: 01/23/21 10:44 PM    Specimen: Nasopharyngeal; Respirate   Result Value Ref Range    SARS-CoV-2 Source NP Swab    SARS-COV Antigen KRISTEL: Collect dry nasal swab AND NP swab in VTM    Collection Time: 01/23/21 10:49 PM   Result Value Ref Range    SARS-CoV-2 Source NP Swab     SARS-COV ANTIGEN KRISTEL NotDetected Not-Detected   Lipid Profile    Collection Time: 01/24/21  2:10 AM   Result Value Ref Range    Cholesterol,Tot 149 100 - 199 mg/dL    Triglycerides 162 (H) 0 - 149 mg/dL    HDL 37 (A) >=40 mg/dL    LDL 80 <100 mg/dL   CBC with Differential    Collection Time: 01/24/21  2:10 AM   Result Value Ref Range    WBC 7.2 4.8 - 10.8 K/uL    RBC 4.07 (L) 4.70 - 6.10 M/uL    Hemoglobin 13.7 (L) 14.0 - 18.0 g/dL    Hematocrit 40.3 (L) 42.0 - 52.0 %    MCV 99.0 (H) 81.4 - 97.8 fL    MCH 33.7 (H) 27.0 - 33.0 pg    MCHC 34.0 33.7 - 35.3 g/dL    RDW 47.8 35.9 - 50.0 fL    Platelet Count 160 (L) 164 - 446 K/uL    MPV 10.0 9.0 - 12.9 fL    Neutrophils-Polys 76.10 (H) 44.00 - 72.00 %    Lymphocytes 14.80 (L) 22.00 - 41.00 %    Monocytes 7.90 0.00 - 13.40 %    Eosinophils 0.40 0.00 - 6.90 %    Basophils 0.40 0.00 - 1.80 %    Immature Granulocytes 0.40 0.00 - 0.90 %    Nucleated RBC 0.00 /100 WBC    Neutrophils (Absolute) 5.50 1.82 - 7.42 K/uL    Lymphs (Absolute) 1.07 1.00 - 4.80 K/uL    Monos (Absolute) 0.57 0.00 - 0.85 K/uL    Eos (Absolute) 0.03 0.00 - 0.51 K/uL    Baso (Absolute) 0.03 0.00 - 0.12 K/uL    Immature Granulocytes (abs) 0.03 0.00 - 0.11 K/uL    NRBC (Absolute) 0.00 K/uL   Comp Metabolic Panel (CMP)    Collection Time: 01/24/21  2:10 AM   Result Value Ref Range    Sodium 134 (L) 135 - 145 mmol/L    Potassium 4.7 3.6 - 5.5 mmol/L    Chloride 102 96 - 112 mmol/L    Co2 19 (L) 20 - 33 mmol/L    Anion Gap 13.0 7.0 - 16.0    Glucose 96 65 - 99 mg/dL    Bun 10 8 - 22 mg/dL    Creatinine 0.90 0.50 - 1.40 mg/dL    Calcium 7.6 (L) 8.5 - 10.5 mg/dL     AST(SGOT) 18 12 - 45 U/L    ALT(SGPT) 22 2 - 50 U/L    Alkaline Phosphatase 50 30 - 99 U/L    Total Bilirubin 0.5 0.1 - 1.5 mg/dL    Albumin 3.5 3.2 - 4.9 g/dL    Total Protein 6.0 6.0 - 8.2 g/dL    Globulin 2.5 1.9 - 3.5 g/dL    A-G Ratio 1.4 g/dL   ESTIMATED GFR    Collection Time: 01/24/21  2:10 AM   Result Value Ref Range    GFR If African American >60 >60 mL/min/1.73 m 2    GFR If Non African American >60 >60 mL/min/1.73 m 2       Imaging  DX-CHEST-PORTABLE (1 VIEW)   Final Result      No acute cardiopulmonary abnormality.      CT-CTA NECK WITH & W/O-POST PROCESSING   Final Result      1.  Occlusion of the right common carotid artery just distal to its origin related intraluminal thrombus which extends from that point to the carotid bifurcation.   2.  High-grade stenosis of both internal carotid arterial origins      CT-CTA HEAD WITH & W/O-POST PROCESS   Final Result      1.  Occlusion of the M1 segment of the left middle cerebral artery.   2.  Unremarkable CT angiogram of the head otherwise.   3.  No acute abnormality of the precontrast scan.      These findings were discussed with RADHA HUMPHRIES on 1/23/2021 at 2236 hours.      CT-CEREBRAL PERFUSION ANALYSIS   Final Result      1.  Cerebral blood flow less than 30% likely representing completed infarct = 17 mL.      2.  T Max more than 6 seconds likely representing combination of completed infarct and ischemia = 138 mL.      3.  Mismatched volume likely representing ischemic brain/penumbra =  121 mL      4.  Please note that the cerebral perfusion was performed on the limited brain tissue around the basal ganglia region. Infarct/ischemia outside the CT perfusion sections can be missed in this study.      CT-HEAD W/O   Final Result      Normal CT scan of the head without contrast.               INTERPRETING LOCATION:  [address] [facility] [place], [address]      MR-BRAIN-W/O    (Results Pending)   EC-ECHOCARDIOGRAM COMPLETE W/O CONT    (Results Pending)   IR-THROMBO  MECHANICAL ARTERY,INIT    (Results Pending)   CT-HEAD W/O    (Results Pending)       Assessment/Plan  * Cerebrovascular accident (CVA) due to occlusion of left middle cerebral artery (HCC)  Assessment & Plan  Presented with L MCA syndrome   L M1 occlusion, 70% L ICA 50-70% R ICA  Status post left carotid angioplasty, stent placement on the left MCA thrombectomy.  TICI 2b flow       Plan:  SBP < 140 using nicardipine gtt  Integrilin until 2 pm 1/24/21  CT head 6 AM - if no hemorrhage will deliver  mg and Plavix 300 mg at 10 AM 1/24/21  Euthermia  Euglycemia  Q1 hour neuro checks  Formal ECHO  Lipid panel    Internal carotid artery occlusion, left  Assessment & Plan  S/p Angioplasty and stent placement   Integrelin drip, gtt as per IR continue until 2 pm 1/24/2021  Load with Aspirin 325 mg and Plavix 300 mg at 10 am 1/24/2021 as per IR  Continue with Aspirin 81 mg daily and plavix 75 mg daily afterwards    Alcohol abuse  Assessment & Plan  Thiamine and vitamin supplementation  Electrolyte repletion  If withdrawal begins initiate RASS based ativan administration      Discussed patient condition and risk of morbidity and/or mortality with Hospitalist, RN, RT, Pharmacy, Charge nurse / hot rounds and Patient.    The patient remains critically ill.  Critical care time = 64 minutes in directly providing and coordinating critical care and extensive data review.  No time overlap and excludes procedures.

## 2021-01-24 NOTE — ASSESSMENT & PLAN NOTE
Per his family, high quantities of alcohol intake, approximately 4-6 sixpacks of beer daily.  Monitor for withdrawal  Check electrolytes magnesium and phosphorus   High dose IV thiamine/MVI/folate

## 2021-01-24 NOTE — H&P
Hospital Medicine History & Physical Note    Date of Service  1/23/2021    Primary Care Physician  No primary care provider on file.    Consultants  Neurology - Dr. García   IR- Dr. Cordero   Critical Care - Dr. Lua     Code Status  Full Code    Chief Complaint  Chief Complaint   Patient presents with   • Possible Stroke       History of Presenting Illness  60 year old male smoker, alcohol abuse who presented [facility]/23/20[facility] at [facility] with stroke like symptoms. History is primarily obtained from chart review due to expressive aphasia. On 1/23/2021 approximately at 5:30 pm his friend, Shane Peace, [phone]) was on the phone with Sánchez having a normal conversation and speech was normal but suddenly he dropped the phone. Shane called him a couple of times and he did not  so he drove over to his house to check up on him at his house, sánchez was found on the floor and he called 911, was brought to [place] with right sided facial droop. Patient was transferred to [facility] for higher level of care. Stroke protocol activated upon arrival, CTA head/neck significant for Left M1 Occlusion and  high-grade, approximate 70% stenosis of the left internal carotid with approximately 50-70% stenosis of the right internal carotid. Neurologist, Dr. García, was consulted who discussed with interventional radiology for thrombectomy. Dr. Cordero preformed Left MCA thrombectomy and L carotid angioplasty with stenting. He will be admitted to ICU for further monitoring and care.       Review of Systems  ROS    Past Medical History  Unknown due to acute neurological status     Surgical History  Unknown due to acute neurological status     Family History  Reviewed     Social History    1 PPD tobacco smoker unclear how long as per friend   6 pack of 16 oz beer every other day at least 2-3 years (rough estimate as per friend)     Allergies  Not on File    Medications  None       Physical Exam  Temp:  [36.8 °C (98.3  °F)] 36.8 °C (98.3 °F)  Pulse:  [] 101  Resp:  [15-24] 24  BP: (172-188)/() 188/105  SpO2:  [99 %] 99 %    Physical Exam  Constitutional:       Comments: Confused   HENT:      Head:      Comments: R sided facial droop      Mouth/Throat:      Pharynx: Oropharynx is clear.   Neck:      Musculoskeletal: Neck supple.   Cardiovascular:      Rate and Rhythm: Tachycardia present.   Pulmonary:      Effort: Pulmonary effort is normal.      Breath sounds: Normal breath sounds.   Abdominal:      General: Abdomen is flat.      Palpations: Abdomen is soft.   Musculoskeletal:         General: No swelling or tenderness.      Comments: R Lower extremity 4/5 R upper extremity 4/5   L Lower extremity 5/5 L upper extremity 5/5    Skin:     General: Skin is warm and dry.      Capillary Refill: Capillary refill takes less than 2 seconds.   Neurological:      Comments: Expressive aphasia   R sided facial droop   R Lower extremity 4/5 R upper extremity 4/5   L Lower extremity 5/5 L upper extremity 5/5            Laboratory:  Recent Labs     01/23/21 2207   WBC 6.9   RBC 4.99   HEMOGLOBIN 16.6   HEMATOCRIT 48.8   MCV 97.8   MCH 33.3*   MCHC 34.0   RDW 47.1   PLATELETCT 186   MPV 10.0     Recent Labs     01/23/21 2207   SODIUM 138   POTASSIUM 4.2   CHLORIDE 103   CO2 22   GLUCOSE 88   BUN 11   CREATININE 0.88   CALCIUM 9.2     Recent Labs     01/23/21 2207   ALTSGPT 28   ASTSGOT 28   ALKPHOSPHAT 63   TBILIRUBIN 0.4   GLUCOSE 88     Recent Labs     01/23/21 2207   APTT 26.1   INR 1.11     No results for input(s): NTPROBNP in the last 72 hours.      Recent Labs     01/23/21 2207   TROPONINT 12       Imaging:  DX-CHEST-PORTABLE (1 VIEW)   Final Result      No acute cardiopulmonary abnormality.      CT-CTA NECK WITH & W/O-POST PROCESSING   Final Result      1.  Occlusion of the right common carotid artery just distal to its origin related intraluminal thrombus which extends from that point to the carotid bifurcation.   2.   High-grade stenosis of both internal carotid arterial origins      CT-CTA HEAD WITH & W/O-POST PROCESS   Final Result      1.  Occlusion of the M1 segment of the left middle cerebral artery.   2.  Unremarkable CT angiogram of the head otherwise.   3.  No acute abnormality of the precontrast scan.      These findings were discussed with RADHA HUMPHRIES on 1/23/2021 at 2236 hours.      CT-CEREBRAL PERFUSION ANALYSIS   Final Result      1.  Cerebral blood flow less than 30% likely representing completed infarct = 17 mL.      2.  T Max more than 6 seconds likely representing combination of completed infarct and ischemia = 138 mL.      3.  Mismatched volume likely representing ischemic brain/penumbra =  121 mL      4.  Please note that the cerebral perfusion was performed on the limited brain tissue around the basal ganglia region. Infarct/ischemia outside the CT perfusion sections can be missed in this study.      CT-HEAD W/O   Final Result      Normal CT scan of the head without contrast.               INTERPRETING LOCATION:  [address] [place] [address], [place], [facility]      MR-BRAIN-W/O    (Results Pending)         Assessment/Plan:  I anticipate this patient will require at least two midnights for appropriate medical management, necessitating inpatient admission.    * Cerebrovascular accident (CVA) due to occlusion of left middle cerebral artery (HCC)  Assessment & Plan  Admit to ICU   Neurology consulted - Dr. García   Left M1 occlusion s/p thrombectomy by IR - Dr. Gil Boyd drip, gtt as per IR stop at 2 pm 1/24/2021  Load with Aspirin 325 mg and Plavix 300 mg at 10 am 1/24/2021 as per IR and continue with Aspirin 81 mg daily and plavix 75 mg daily afterwards   Continue with nicardipine, gtt goal < 140   Telemetry monitoring   Neuro-checks every 1 hour   Repeat CT at 6 am 1/24/2021   MRI brain, if no contraindications   High intensity statin   Echocardiogram with bubble study   Lipid panel goal LDL<70 and hemoglobin a1c    PT/OT/SLP        Internal carotid artery stenosis, left  Assessment & Plan  S/p Angioplasty and stent placement   Integrelin drip, gtt as per IR continue until 2 pm 1/24/2021  Load with Aspirin 325 mg and Plavix 300 mg at 10 am 1/24/2021 as per IR  Continue with Aspirin 81 mg daily and plavix 75 mg daily afterwards            Tobacco abuse  Assessment & Plan   on cessation and hazards associated with continued use.     Alcohol abuse  Assessment & Plan  Monitor for withdrawal   Check electrolytes magnesium and phosphorus   Thiamine and folic acid     VTE: SCD

## 2021-01-24 NOTE — THERAPY
Speech Language Pathology   Clinical Swallow Evaluation     Patient Name: Sánchez Leija  AGE:  60 y.o., SEX:  male  Medical Record #: [id number]  Today's Date: 1/24/2021     Precautions  Precautions: (P) Fall Risk    Assessment    Patient is 60 y.o. male who presented 1/23/2021 with CVA secondary to L M1 occlusion and ICA stenosis. Left MCA thrombectomy and L carotid angioplasty with stenting. Patient is presenting with global aphasia. CXR was unremarkable. Head CT: Occlusion of the right common carotid artery just distal to its origin related intraluminal thrombus which extends from that point to the carotid bifurcation.    The patient was seen on this date for a clinical swallow evaluation. The patient is noted to be globally aphasic, unable to follow spoken directives, but was able to follow some gestured cues. An informal oral mechanism exam revealed right sided facial droop, but lingual and labial musculature with adequate strength and range of motion to consume PO. The patient was provided PO trials of ice chips, mildly thick liquids, thin liquids, liquidized and soft solid textures. The patient consumed all trials without any overt s/sx of aspiration. Mastication was prolonged on soft solids with minimal oral residue appreciated. Oral residue was cleared easily with liquid wash. The patient initially self-fed but demonstrated some difficulty with coordination, requiring intermittent support to feed. Recommend initiation of soft and bite sized diet given direct supervision to provide support to feed as needed.     Plan    Recommend Speech Therapy 3 times per week until therapy goals are met for the following treatments:  Dysphagia Training.    Discharge Recommendations: Recommend post-acute placement for additional speech therapy services prior to discharge home       Objective       01/24/21 0850   Oral Motor Eval    Is Patient Able to Complete Oral Motor Eval Yes but Impaired   Labial Function   Labial  Structure At Rest Minimal   Labial Vowel Production / I /, / U / Moderate   Labial Sequence / I /, / U / Moderate   Velar Function   Velar Structure At Rest Within Functional Limits   Laryngeal Function   Voice Quality Moderate   Oral Food Presentation   Ice Chips Within Functional Limits   Single Swallow Mildly Thick (2) - (Nectar Thick)  Within Functional Limits   Serial Swallow Mildly Thick (2) - (Nectar Thick) Within Functional Limits   Single Swallow Thin (0) Within Functional Limits   Serial Swallow Thin (0) Within Functional Limits   Liquidised (3) Within Functional Limits   Pureed (4) Within Functional Limits   Soft & Bite-Sized (6) - (Dysphagia III) Minimal   Tracheostomy   Tracheostomy  No   Dysphagia Strategies / Recommendations   Strategies / Interventions Recommended (Yes / No) Yes   Compensatory Strategies Direct Supervision During Meals   Diet / Liquid Recommendation Thin (0);Soft & Bite-Sized (6) - (Dysphagia III)   Medication Administration  Whole with Liquid Wash   Therapy Interventions Dysphagia Therapy By Speech Language Pathologist   Dysphagia Rating   Nutritional Liquid Intake Rating Scale Non thickened beverages   Nutritional Food Intake Rating Scale Total oral diet with multiple consistencies without special preparation but with specific food limitations   Patient / Family Goals   Patient / Family Goal #1 none stated    Short Term Goals   Short Term Goal # 1 The patient will consume SB6/TN0 meal tray without any overt s/sx of aspiration

## 2021-01-25 ENCOUNTER — HOSPITAL ENCOUNTER (INPATIENT)
Facility: REHABILITATION | Age: 61
End: 2021-01-25
Attending: PHYSICAL MEDICINE & REHABILITATION | Admitting: PHYSICAL MEDICINE & REHABILITATION
Payer: COMMERCIAL

## 2021-01-25 ENCOUNTER — HOSPITAL ENCOUNTER (OUTPATIENT)
Dept: RADIOLOGY | Facility: MEDICAL CENTER | Age: 61
End: 2021-01-25
Payer: COMMERCIAL

## 2021-01-25 LAB
ANION GAP SERPL CALC-SCNC: 7 MMOL/L (ref 7–16)
BASOPHILS # BLD AUTO: 0.4 % (ref 0–1.8)
BASOPHILS # BLD: 0.03 K/UL (ref 0–0.12)
BUN SERPL-MCNC: 10 MG/DL (ref 8–22)
CALCIUM SERPL-MCNC: 8.4 MG/DL (ref 8.5–10.5)
CHLORIDE SERPL-SCNC: 108 MMOL/L (ref 96–112)
CO2 SERPL-SCNC: 22 MMOL/L (ref 20–33)
CREAT SERPL-MCNC: 0.89 MG/DL (ref 0.5–1.4)
EOSINOPHIL # BLD AUTO: 0.09 K/UL (ref 0–0.51)
EOSINOPHIL NFR BLD: 1.2 % (ref 0–6.9)
ERYTHROCYTE [DISTWIDTH] IN BLOOD BY AUTOMATED COUNT: 49.1 FL (ref 35.9–50)
GLUCOSE SERPL-MCNC: 98 MG/DL (ref 65–99)
HCT VFR BLD AUTO: 36 % (ref 42–52)
HGB BLD-MCNC: 12.1 G/DL (ref 14–18)
IMM GRANULOCYTES # BLD AUTO: 0.02 K/UL (ref 0–0.11)
IMM GRANULOCYTES NFR BLD AUTO: 0.3 % (ref 0–0.9)
LYMPHOCYTES # BLD AUTO: 1.72 K/UL (ref 1–4.8)
LYMPHOCYTES NFR BLD: 22 % (ref 22–41)
MAGNESIUM SERPL-MCNC: 2 MG/DL (ref 1.5–2.5)
MCH RBC QN AUTO: 34 PG (ref 27–33)
MCHC RBC AUTO-ENTMCNC: 33.6 G/DL (ref 33.7–35.3)
MCV RBC AUTO: 101.1 FL (ref 81.4–97.8)
MONOCYTES # BLD AUTO: 0.83 K/UL (ref 0–0.85)
MONOCYTES NFR BLD AUTO: 10.6 % (ref 0–13.4)
NEUTROPHILS # BLD AUTO: 5.12 K/UL (ref 1.82–7.42)
NEUTROPHILS NFR BLD: 65.5 % (ref 44–72)
NRBC # BLD AUTO: 0 K/UL
NRBC BLD-RTO: 0 /100 WBC
PLATELET # BLD AUTO: 127 K/UL (ref 164–446)
PMV BLD AUTO: 10.4 FL (ref 9–12.9)
POTASSIUM SERPL-SCNC: 4.4 MMOL/L (ref 3.6–5.5)
RBC # BLD AUTO: 3.56 M/UL (ref 4.7–6.1)
SODIUM SERPL-SCNC: 137 MMOL/L (ref 135–145)
WBC # BLD AUTO: 7.8 K/UL (ref 4.8–10.8)

## 2021-01-25 PROCEDURE — 85025 COMPLETE CBC W/AUTO DIFF WBC: CPT

## 2021-01-25 PROCEDURE — 770020 HCHG ROOM/CARE - TELE (206)

## 2021-01-25 PROCEDURE — 700102 HCHG RX REV CODE 250 W/ 637 OVERRIDE(OP): Performed by: STUDENT IN AN ORGANIZED HEALTH CARE EDUCATION/TRAINING PROGRAM

## 2021-01-25 PROCEDURE — 80048 BASIC METABOLIC PNL TOTAL CA: CPT

## 2021-01-25 PROCEDURE — 700102 HCHG RX REV CODE 250 W/ 637 OVERRIDE(OP): Performed by: INTERNAL MEDICINE

## 2021-01-25 PROCEDURE — 99233 SBSQ HOSP IP/OBS HIGH 50: CPT | Performed by: PSYCHIATRY & NEUROLOGY

## 2021-01-25 PROCEDURE — A9270 NON-COVERED ITEM OR SERVICE: HCPCS | Performed by: STUDENT IN AN ORGANIZED HEALTH CARE EDUCATION/TRAINING PROGRAM

## 2021-01-25 PROCEDURE — 99233 SBSQ HOSP IP/OBS HIGH 50: CPT | Performed by: HOSPITALIST

## 2021-01-25 PROCEDURE — 770006 HCHG ROOM/CARE - MED/SURG/GYN SEMI*

## 2021-01-25 PROCEDURE — 97161 PT EVAL LOW COMPLEX 20 MIN: CPT

## 2021-01-25 PROCEDURE — 700111 HCHG RX REV CODE 636 W/ 250 OVERRIDE (IP): Performed by: HOSPITALIST

## 2021-01-25 PROCEDURE — 700111 HCHG RX REV CODE 636 W/ 250 OVERRIDE (IP): Performed by: INTERNAL MEDICINE

## 2021-01-25 PROCEDURE — 83735 ASSAY OF MAGNESIUM: CPT

## 2021-01-25 PROCEDURE — 700105 HCHG RX REV CODE 258: Performed by: INTERNAL MEDICINE

## 2021-01-25 PROCEDURE — A9270 NON-COVERED ITEM OR SERVICE: HCPCS | Performed by: INTERNAL MEDICINE

## 2021-01-25 PROCEDURE — 97165 OT EVAL LOW COMPLEX 30 MIN: CPT

## 2021-01-25 RX ADMIN — CLOPIDOGREL BISULFATE 75 MG: 75 TABLET ORAL at 05:30

## 2021-01-25 RX ADMIN — ASPIRIN 81 MG: 81 TABLET, COATED ORAL at 05:30

## 2021-01-25 RX ADMIN — ENOXAPARIN SODIUM 40 MG: 40 INJECTION SUBCUTANEOUS at 16:30

## 2021-01-25 RX ADMIN — MULTIPLE VITAMINS W/ MINERALS TAB 1 TABLET: TAB at 05:30

## 2021-01-25 RX ADMIN — DOCUSATE SODIUM 50 MG AND SENNOSIDES 8.6 MG 2 TABLET: 8.6; 5 TABLET, FILM COATED ORAL at 05:29

## 2021-01-25 RX ADMIN — ATORVASTATIN CALCIUM 80 MG: 80 TABLET, FILM COATED ORAL at 18:14

## 2021-01-25 RX ADMIN — THIAMINE HYDROCHLORIDE 500 MG: 100 INJECTION, SOLUTION INTRAMUSCULAR; INTRAVENOUS at 05:31

## 2021-01-25 RX ADMIN — DOCUSATE SODIUM 50 MG AND SENNOSIDES 8.6 MG 2 TABLET: 8.6; 5 TABLET, FILM COATED ORAL at 18:14

## 2021-01-25 RX ADMIN — FOLIC ACID 1 MG: 1 TABLET ORAL at 05:30

## 2021-01-25 ASSESSMENT — GAIT ASSESSMENTS
GAIT LEVEL OF ASSIST: MINIMAL ASSIST
DISTANCE (FEET): 100

## 2021-01-25 ASSESSMENT — COGNITIVE AND FUNCTIONAL STATUS - GENERAL
SUGGESTED CMS G CODE MODIFIER DAILY ACTIVITY: CK
DRESSING REGULAR UPPER BODY CLOTHING: A LITTLE
WALKING IN HOSPITAL ROOM: A LITTLE
CLIMB 3 TO 5 STEPS WITH RAILING: A LITTLE
DAILY ACTIVITIY SCORE: 19
MOVING FROM LYING ON BACK TO SITTING ON SIDE OF FLAT BED: A LITTLE
SUGGESTED CMS G CODE MODIFIER MOBILITY: CK
TOILETING: A LITTLE
PERSONAL GROOMING: A LITTLE
STANDING UP FROM CHAIR USING ARMS: A LITTLE
HELP NEEDED FOR BATHING: A LITTLE
DRESSING REGULAR LOWER BODY CLOTHING: A LITTLE
MOVING TO AND FROM BED TO CHAIR: A LITTLE
MOBILITY SCORE: 19

## 2021-01-25 ASSESSMENT — ACTIVITIES OF DAILY LIVING (ADL): TOILETING: INDEPENDENT

## 2021-01-25 NOTE — DISCHARGE PLANNING
Acute Rehab Hospital/ Transitional Care Coordination  Rehab Physiatry consult completed - pending therapy eval when feasible.     Pt lives in [facility] with spouse.  He is covered through [facility] which is not a provider for in pt rehab in [place].  He will need to access services in [place] in order for medical to cover costs.     Post acute services to be determined; pending therapy evals.     Thank you for referral.

## 2021-01-25 NOTE — PROGRESS NOTES
Hospital Neurology Progress Note:     Interval History: No acute events overnight. States feels better/speech is better.     Objective:   Vitals:    01/25/21 0300 01/25/21 0400 01/25/21 0500 01/25/21 0600   BP: 134/63 132/60 156/72 132/63   Pulse: 66 60 66 62   Resp: 20 15 15 20   Temp:  36.4 °C (97.6 °F)     TempSrc:  Temporal     SpO2: 97% 96% 95% 98%   Weight:       Height:           Labs:     Lab Results   Component Value Date/Time    PROTHROMBTM 14.7 (H) 01/23/2021 10:07 PM    INR 1.11 01/23/2021 10:07 PM      Lab Results   Component Value Date/Time    WBC 7.8 01/25/2021 05:45 AM    RBC 3.56 (L) 01/25/2021 05:45 AM    HEMOGLOBIN 12.1 (L) 01/25/2021 05:45 AM    HEMATOCRIT 36.0 (L) 01/25/2021 05:45 AM    .1 (H) 01/25/2021 05:45 AM    MCH 34.0 (H) 01/25/2021 05:45 AM    MCHC 33.6 (L) 01/25/2021 05:45 AM    MPV 10.4 01/25/2021 05:45 AM    NEUTSPOLYS 65.50 01/25/2021 05:45 AM    LYMPHOCYTES 22.00 01/25/2021 05:45 AM    MONOCYTES 10.60 01/25/2021 05:45 AM    EOSINOPHILS 1.20 01/25/2021 05:45 AM    BASOPHILS 0.40 01/25/2021 05:45 AM      Lab Results   Component Value Date/Time    SODIUM 137 01/25/2021 05:45 AM    POTASSIUM 4.4 01/25/2021 05:45 AM    CHLORIDE 108 01/25/2021 05:45 AM    CO2 22 01/25/2021 05:45 AM    GLUCOSE 98 01/25/2021 05:45 AM    BUN 10 01/25/2021 05:45 AM    CREATININE 0.89 01/25/2021 05:45 AM      Lab Results   Component Value Date/Time    CHOLSTRLTOT 149 01/24/2021 02:10 AM    LDL 80 01/24/2021 02:10 AM    HDL 37 (A) 01/24/2021 02:10 AM    TRIGLYCERIDE 162 (H) 01/24/2021 02:10 AM       Lab Results   Component Value Date/Time    ALKPHOSPHAT 50 01/24/2021 02:10 AM    ASTSGOT 18 01/24/2021 02:10 AM    ALTSGPT 22 01/24/2021 02:10 AM    TBILIRUBIN 0.5 01/24/2021 02:10 AM        Imaging/Testing:   MRI Brain W/O CST personally reviewed w/ LMCA distribution infarct w/o hemorrhagic transformation.     CTA Head/Neck reviewed in chart.     CTP Reviewed in chart.     TTE reviewed in chart w/ biatrial  enlargement.     Physical Exam:     General: 61 y/o male in bed in NAD  Cardio: Normal S1/S2. No peripheral edema.   Pulm: CTAX2. No respiratory distress.   Skin: Warm, dry, no rashes or lesions   Psychiatric: Appropriate affect. No active psychosis.  HEENT: Atraumatic head, normal sclera and conjunctiva, moist oral mucosa. No lid lag.  Abdomen: Soft, non tender. No masses or hepatosplenomegaly.    Neurologic:  Mental Status:  AAOx4. Able to follow commands/cross midline. Presence of broca's aphasia.   Cranial Nerves: PEERL, EOMi. Face asymmetric on the R.   Motor: Normal muscle tone/bulk. Strength is 5/5 on L hemibody and 4+/5 on the R.   Reflexes: Deferred  Coordination: Finger-nose w/o ataxia  Sensation: Normal to light touch throughout  Gait/Station: Deferred    Assessment/Plan:    Sánchez Leija is a 60 y.o. male presenting w/ stroke. Patient was transferred from OSH w/ a LM1 occlusion w/ severe bilateral carotid stenosis and right carotid occlusion s/p thrombectomy and L carotid stenting. Patient is currently doing well with some mild expressive aphasia. He was transitioned to DAPT on 1/24/21 and has done well with therapy. MRI Brain W/O CST did not show hemorrhagic transformation. Patient is a smoker, and I explained to him how this is a major risk factor for stroke and he should stop. He appeared motivated to quit on our conversation. Etiology of stroke is likely artery-artery embolization from severe LICA stenosis.     Plan:  -Continue DAPT w/ ASA 81mg PO daily + Plavix 75mg PO Daily for stroke/stent.  -Continue high intensity statin.  -LDL 80, goal < 70  -Hgb A1C 5.9, at goal.  -Will need cardiac event monitoring due to biatrial enlargement.   -Stroke bridge clinic followup placed.  -Aim for normotension.  -At this time no further recommendations; signing off. Please call w/ any further questions or concerns.   -Plan discussed with consulting physician and patient's nurse.     Juan Pablo Bravo M.D.,  Diplomat of the American Board of Psychiatry and Neurology  Diplomat of [employer]BP[employer] Epilepsy Subspecialty   Assistant Clinical Professor, R  [facility] Neurology Consultant

## 2021-01-25 NOTE — PROGRESS NOTES
Discussed indications for zio patch with cardiology, they feel that at this point ambulatory EKG monitoring is not indicated and that he can follow-up with his primary care provider for referral if needed. He will continue to be monitored on telemetry while inpatient, if atrial fibrillation is detected during his time here we can discuss the risks and benefits of anticoagulation with him.

## 2021-01-25 NOTE — PROGRESS NOTES
Pt is alert, with expressive aphasia, which is improving. Pt is able to say some short words or short phrases. FC, ANDUJAR, slight facial droop. R side is a little weaker in comparison to the L. R groin site is CDI.   SR, SBP WNL. 500cc LR bolus given for low BP x1. Lungs are clear. SLP cleared pt for diet. Pt had 2 large BM's during shift. Stool softeners held this evening.     Updates given to Joss (dad) and Shane (friend).

## 2021-01-25 NOTE — DISCHARGE PLANNING
[facility] Acute Rehabilitation Transitional Care Coordination     Referral from:  Dr. Garcia    Facesheet indicates: No medical provider has been identified @ this time.  Please reach out to a PFA for a screening.     Potential Rehab Diagnosis:  LM1 occlusion w/ severe bilateral carotid stenosis and right carotid occlusion     Chart review indicates patient may have on going medical management and may have therapy needs to possibly meet inpatient rehab facility criteria with the goal of returning to community.    D/C support: TBD     Physiatry consultation forwarded per protocol.      Presented from an OSH- LM1 occlusion w/ severe bilateral carotid stenosis and right carotid occlusion s/p thrombectomy and L carotid stenting.  Would appreciate TX diana once appropriate.  Physiatry to consult.  Waiting on additional information to determine appropriateness for acute inpatient rehabilitation. Will continue to follow.     Last Covid test date: 01-23-21 negative    Thank you for the referral.

## 2021-01-25 NOTE — CONSULTS
Physical Medicine and Rehabilitation Consultation         Initial Consult      Initial Consultation Date: 1/25/2021  Consulting provider: Dr. Garcia  Reason for consultation: assess for acute inpatient rehab appropriateness  LOS: 2 Day(s)      Chief complaint: stroke        HPI:   The patient is a 60 y.o. male with a past medical history of smoker, alcohol abuse;  who presented on 1/23/2021 10:02 PM with expressive aphasia. Found on the floor by friend Shane, with right facial droop. Transferred to [facility] from [facility] for stroke management. CTA head/neck showed left M1 occlusion, high grade 70% occlusion left ICA, and high grade 50-70% occlusion of right ICA. s/p left MCA thrombectomy and left carotid angioplasty with stenting by Dr. Cordero on 1/24. MRI without hemorrhagic transformation. Transitioned from integrilin to DAPT.         Social Hx:  Need more information       Current level of function:   PT/OT: pending eval   SLP, 1/24: soft and bite sized diet      PMH:  No past medical history on file.      PSH:  No past surgical history on file.      FHX: Reviewed.  No family history on file.            Medications:  Current Facility-Administered Medications   Medication Dose   • atorvastatin (LIPITOR) tablet 80 mg  80 mg   • aspirin EC (ECOTRIN) tablet 81 mg  81 mg   • clopidogrel (PLAVIX) tablet 75 mg  75 mg   • therapeutic multivitamin-minerals (THERAGRAN-M) tablet 1 Tab  1 Tab   • MD Alert...ICU Electrolyte Replacement per Pharmacy     • folic acid (FOLVITE) tablet 1 mg  1 mg   • thiamine (B-1) 500 mg in 5% dextrose 100 mL IVPB  500 mg    Followed by   • [START ON 1/27/2021] thiamine (Vitamin B-1) tablet 100 mg  100 mg   • senna-docusate (PERICOLACE or SENOKOT S) 8.6-50 MG per tablet 2 Tab  2 Tab    And   • polyethylene glycol/lytes (MIRALAX) PACKET 1 Packet  1 Packet    And   • magnesium hydroxide (MILK OF MAGNESIA) suspension 30 mL  30 mL    And   • bisacodyl (DULCOLAX) suppository 10 mg  10 mg  "      Allergies:  Not on File      Vitals: /56   Pulse (!) 57   Temp 36.4 °C (97.6 °F) (Temporal)   Resp 18   Ht 1.88 m (6' 2.02\")   Wt 71.5 kg (157 lb 10.1 oz)   SpO2 100%           Labs: Reviewed and significant for 1/25: Hgb 12.1, Na 137, K4.4, Cr 0.89  Recent Labs     01/23/21 2207 01/24/21 0210 01/25/21  0545   RBC 4.99 4.07* 3.56*   HEMOGLOBIN 16.6 13.7* 12.1*   HEMATOCRIT 48.8 40.3* 36.0*   PLATELETCT 186 160* 127*   PROTHROMBTM 14.7*  --   --    APTT 26.1  --   --    INR 1.11  --   --      Recent Labs     01/23/21 2207 01/24/21 0210 01/25/21  0545   SODIUM 138 134* 137   POTASSIUM 4.2 4.7 4.4   CHLORIDE 103 102 108   CO2 22 19* 22   GLUCOSE 88 96 98   BUN 11 10 10   CREATININE 0.88 0.90 0.89   CALCIUM 9.2 7.6* 8.4*     Recent Results (from the past 24 hour(s))   CBC WITH DIFFERENTIAL    Collection Time: 01/25/21  5:45 AM   Result Value Ref Range    WBC 7.8 4.8 - 10.8 K/uL    RBC 3.56 (L) 4.70 - 6.10 M/uL    Hemoglobin 12.1 (L) 14.0 - 18.0 g/dL    Hematocrit 36.0 (L) 42.0 - 52.0 %    .1 (H) 81.4 - 97.8 fL    MCH 34.0 (H) 27.0 - 33.0 pg    MCHC 33.6 (L) 33.7 - 35.3 g/dL    RDW 49.1 35.9 - 50.0 fL    Platelet Count 127 (L) 164 - 446 K/uL    MPV 10.4 9.0 - 12.9 fL    Neutrophils-Polys 65.50 44.00 - 72.00 %    Lymphocytes 22.00 22.00 - 41.00 %    Monocytes 10.60 0.00 - 13.40 %    Eosinophils 1.20 0.00 - 6.90 %    Basophils 0.40 0.00 - 1.80 %    Immature Granulocytes 0.30 0.00 - 0.90 %    Nucleated RBC 0.00 /100 WBC    Neutrophils (Absolute) 5.12 1.82 - 7.42 K/uL    Lymphs (Absolute) 1.72 1.00 - 4.80 K/uL    Monos (Absolute) 0.83 0.00 - 0.85 K/uL    Eos (Absolute) 0.09 0.00 - 0.51 K/uL    Baso (Absolute) 0.03 0.00 - 0.12 K/uL    Immature Granulocytes (abs) 0.02 0.00 - 0.11 K/uL    NRBC (Absolute) 0.00 K/uL   Basic Metabolic Panel    Collection Time: 01/25/21  5:45 AM   Result Value Ref Range    Sodium 137 135 - 145 mmol/L    Potassium 4.4 3.6 - 5.5 mmol/L    Chloride 108 96 - 112 mmol/L    " Co2 22 20 - 33 mmol/L    Glucose 98 65 - 99 mg/dL    Bun 10 8 - 22 mg/dL    Creatinine 0.89 0.50 - 1.40 mg/dL    Calcium 8.4 (L) 8.5 - 10.5 mg/dL    Anion Gap 7.0 7.0 - 16.0   MAGNESIUM    Collection Time: 01/25/21  5:45 AM   Result Value Ref Range    Magnesium 2.0 1.5 - 2.5 mg/dL   ESTIMATED GFR    Collection Time: 01/25/21  5:45 AM   Result Value Ref Range    GFR If African American >60 >60 mL/min/1.73 m 2    GFR If Non African American >60 >60 mL/min/1.73 m 2           Imaging:  Ct-cta Head With & W/o-post Process  Result Date: 1/23/2021 1/23/2021 10:08 PM HISTORY/REASON FOR EXAM:  Emergency Medical Condition ? Stroke TECHNIQUE/EXAM DESCRIPTION: CT angiogram of the [place] without and with contrast.  Initial precontrast images were obtained of the head from the skull base through the vertex.  Postcontrast images were obtained of the [place] following the power injection of nonionic contrast at 5.0 mL/sec. Thin-section helical images were obtained with overlapping reconstruction interval. Coronal and sagittal multiplanar volume reformats were generated.  3D angiographic images were reviewed on PACS.  Maximum intensity projection (MIP) images were generated and reviewed. 80 mL of Omnipaque 350 nonionic contrast was injected intravenously. Low dose optimization technique was utilized for this CT exam including automated exposure control and adjustment of the mA and/or kV according to patient size. COMPARISON:  None. FINDINGS: The precontrast scan shows the ventricles and cortical sulci to be somewhat prominent for the patient's age. There is no midline shift or other mass effect. There is no acute intra-axial abnormality. There is no intra or extra-axial hemorrhage. No extra-axial fluid collections. The posterior circulation shows the distal vertebral arteries to be patent. The vertebrobasilar confluence is intact. The basilar artery is patent. No aneurysm or occlusive lesion is evident. The  anterior circulation shows complete occlusion of the left M1 segment. The brain is unremarkable. 3D angiographic/MIP images of the vasculature confirm the vascular findings as described above.     1.  Occlusion of the M1 segment of the left middle cerebral artery. 2.  Unremarkable CT angiogram of the head otherwise. 3.  No acute abnormality of the precontrast scan. These findings were discussed with RADHA HUMPHRIES on 1/23/2021 at 2236 hours.      Ct-cta Neck With & W/o-post Processing  Result Date: 1/23/2021 1/23/2021 10:08 PM HISTORY/REASON FOR EXAM:  Emergency Medical Condition ? Stroke TECHNIQUE/EXAM DESCRIPTION: CT angiogram of the neck with contrast. Postcontrast images were obtained of the neck from the great vessels through the skull base following the power injection of nonionic contrast at 5.0 mL/sec. Thin-section helical images were obtained with overlapping reconstruction interval. Coronal and oblique multiplanar volume reformats were generated. Cervical internal carotid artery percent stenosis is calculated using the standard method according to the NASCET criteria wherein a segment of uniform caliber mid or distal cervical internal carotid is used as the reference denominator. 3D angiographic images were reviewed on PACS.  Maximum intensity projection (MIP) images were generated and reviewed mL of Omnipaque 350 nonionic contrast was injected intravenously. Low dose optimization technique was utilized for this CT exam including automated exposure control and adjustment of the mA and/or kV according to patient size. COMPARISON:  None. FINDINGS: There is occlusion of the right common carotid artery about seven 8 mm distal to its origin, with lucent filling defect seen within the vessel above this point, consistent with intraluminal thrombus, which extends cephalad to about the carotid bifurcation. Intraluminal contrast is then seen in the internal carotid artery, likely from retrograde filling. The arch  origins of the great vessels otherwise appear intact. The aortic arch shows no abnormality. There is marked atherosclerotic calcification involving the upper aspects of both common carotid arteries, both carotid bifurcations, and the origins of both internal carotid arteries. There is high-grade, approximate 70% stenosis of the left internal carotid with approximately 50-70% stenosis of the right internal carotid. The cervical vertebral arteries are normal bilaterally. The neck soft tissues and lung apices in the field of view are unremarkable. 3D angiographic/MIP images of the vasculature confirm the vascular findings as described above.     1.  Occlusion of the right common carotid artery just distal to its origin related intraluminal thrombus which extends from that point to the carotid bifurcation. 2.  High-grade stenosis of both internal carotid arterial origins      Ct-head W/o  Result Date: 1/24/2021  HISTORY/REASON FOR EXAM:  Altered mental status. TECHNIQUE/EXAM DESCRIPTION: CT scan of the head without contrast, 1/24/2021 3:21 AM. Contiguous 5 mm axial sections were obtained from the skull base through the vertex. Up to date radiation dose reduction adjustments have been utilized to meet ALARA standards for radiation dose reduction. COMPARISON:  Yesterday FINDINGS:   There is subtle hazy mixed increased and decreased attenuation in the left MCA territory, with effacement of cortical sulci and there. There is also about 1 mm of left-to-right midline shift. These changes are new when compared with the prior  scan. Ventricular size is unchanged. No acute abnormality in the right cerebral hemisphere or posterior fossa.     Findings consistent with early changes of left MCA territory infarction. INTERPRETING LOCATION:  [address] [place] [address], [id number]      Mr-brain-w/o  Result Date: 1/25/2021 1/24/2021 10:31 PM HISTORY/REASON FOR EXAM:  Neurologic deficit. Left MCA occlusion status post carotid angioplasty, stent  placement and cerebral thrombectomy. TECHNIQUE/EXAM DESCRIPTION AND NUMBER OF VIEWS:  MRI of the brain without contrast. The study was performed on a [employer] Signa 1.5 Annita MRI scanner. Spoiled-GRASS sagittal, thin-section T2 fast spin-echo axial, T1 coronal, and FLAIR coronal images were obtained of the whole brain. FINDINGS:  The calvariae are normal. There are no extra-axial fluid collections. There is a pattern of mild cerebral atrophy manifest as prominence of sulcal markings over the convexities and vertex along with mild ventriculomegaly. There is a small to moderate-sized area of diffusion restriction the left MCA territory consistent with acute infarct. There is no evidence of hemorrhagic transformation. There are several scattered punctate acute infarcts in the bilateral cerebral hemispheres likely of central embolic etiology. There is a pattern of mild supratentorial white matter disease with scattered foci of bright T2 and FLAIR signal in the subcortical and deep white matter of both hemispheres consistent with small vessel ischemic change versus demyelination or gliosis. There is no mass effect or midline shift. There are no hemorrhagic lesions. The brainstem and posterior fossa structures are unremarkable. Vascular flow voids in the carotid and vertebrobasilar arteries, [place], and dural venous sinuses are intact. There has been previous left cataract repair. The visualized paranasal sinuses and mastoid air cells appear clear.     1.  Acute small to moderate-sized left middle cerebral artery territory infarct without evidence of hemorrhagic transformation. 2.  Several scattered punctate acute infarcts in the bilateral cerebral hemispheres likely central embolic etiology. 3.  Mild diffuse cerebral substance loss. 4.  Mild microangiopathic ischemic change versus demyelination or gliosis.      Mr-mra Head-w/o  Result Date: 1/25/2021 1/24/2021 10:35 PM HISTORY/REASON FOR EXAM:  Neurologic deficit.  Left MCA occlusion status post carotid angioplasty, stent placement and cerebral thrombectomy. TECHNIQUE/EXAM DESCRIPTION AND NUMBER OF VIEWS:  MRA of the [place] without contrast. The study was performed on a [employer] Signa 1.5 Annita MRI scanner. MRA of the [place] and the vertebrobasilar junction was performed with 3D time-of-flight technique. FINDINGS:  MRA of the [place] shows no aneurysm or occlusive disease in the anterior circulation. The posterior circulation shows the vertebrobasilar confluence to be intact. There is no aneurysm or occlusive lesion.     MRA of the [place] within normal limits.        Ct-cerebral Perfusion Analysis  Result Date: 1/23/2021 1/23/2021 10:07 PM HISTORY/REASON FOR EXAM:  Emergency Medical Condition ? Stroke. TECHNIQUE/EXAM DESCRIPTION AND NUMBER OF VIEWS: CT Cerebral Perfusion Analysis. The study was performed on a 128 slice Conjectur.E. Lightspeed Multidetector CT scanner. Perfusion data and corresponding time-activity curves are processed and displayed as color-coded maps in the axial plane for Cerebral Blood Flow (CBF), Cerebral Blood Volume  (CBV),T Max and Mean Transit Time (MTT) and are post processed on the Ischemia view-RAPID virtual . 40 mL of Omnipaque 350 nonionic contrast was injected intravenously. Low dose optimization technique was utilized for this CT exam including automated exposure control and adjustment of the mA and/or kV according to patient size. COMPARISON:  None. FINDINGS: Cerebral blood flow less than 30% = 17 mL T Max more than 6 seconds = 138 mL Mismatch volume = 121 mL Mismatch ratio = 8.1     1.  Cerebral blood flow less than 30% likely representing completed infarct = 17 mL. 2.  T Max more than 6 seconds likely representing combination of completed infarct and ischemia = 138 mL. 3.  Mismatched volume likely representing ischemic brain/penumbra =  121 mL 4.  Please note that the cerebral perfusion was performed on the  limited brain tissue around the basal ganglia region. Infarct/ischemia outside the CT perfusion sections can be missed in this study.        Ec-echocardiogram Complete W/o Cont  Result Date: 1/24/2021  Transthoracic Echo Report Echocardiography Laboratory CONCLUSIONS Hyperdynamic left ventricular systolic function. Normal right ventricular size and systolic function. Biatrial enlargement. Mild aortic stenosis. No evidence of right to left shunt by agitated saline challenge. No prior study is available for comparison.             ASSESSMENT:  Patient is a 60 y.o. male admitted with left MCA stroke, expressive aphasia; CTA head/neck showed left M1 occlusion, high grade 70% occlusion left ICA, and high grade 50-70% occlusion of right ICA. s/p left MCA thrombectomy and left carotid angioplasty with stenting by Dr. Cordero on 1/24. MRI brain with left MCA territory infarct, without hemorrhagic transformation.      #Rehab:   -Vitals: stable, RA   -Insurance: none   -Discharge support: ?father  -Rehab Impairment Code: 0001.2 - Stroke: Right Body Involvement (Left Brain)  -Reason for admission: Cerebrovascular accident (CVA) due to occlusion of left middle cerebral artery (HCC)  -Pending therapy eval when feasible  -Lack of insurance is a barrier to rehab/placement      #Neuro: left MCA stroke, expressive aphasia; CTA head/neck showed left M1 occlusion, high grade 70% occlusion left ICA, and high grade 50-70% occlusion of right ICA. s/p left MCA thrombectomy and left carotid angioplasty with stenting by Dr. Cordero on 1/24. MRI brain with left MCA territory infarct, without hemorrhagic transformation  -see “CV”    #CV: A1C 5.9%  -asa, atorvastatin, plavix    #Thrombocytopenia: Plt 127 on 1/25    #Supp: folate, MVI, thiamine      #Anemia: Hgb 12.1 on 1/25 <= 16 on 1/23    #Bowel: 2x on 1/24, senna s    #Bladder: gan removed; external cath      #DVT PPX: SCDs            Thank you for allowing us to participate in the care of this  patient.             Oziel Stratton MD  Physical Medicine and Rehabilitation   1/25/2021

## 2021-01-25 NOTE — PROGRESS NOTES
Pulmonary and Critical Care Medicine Progress Note    Chart reviewed.  Patient seen.  Discussed with RN.  No further critical care needs.    OK to transfer out of ICU.  [facility] will sign off.  Please call if you have any questions.      Torey Benton MD  Pulmonary and Critical Care Medicine

## 2021-01-25 NOTE — PROGRESS NOTES
[facility] Progress Note      Admit Date: 1/23/2021    Resident(s): Zach Shen M.D.   Attending:  TUTU ARIAS/ Dr. Alan Roberson    Patient ID:    Name:  Sánchez Leija   YOB: 1960  Age:  60 y.o.  male   MRN:  [id number]    Hospital Course (carried forward and updated):  Sánchez Leija is a 60 y.o. male with a past medical history of heavy alcohol tobacco use who presented on 1/23/2021 from an outside facility with complaints of right-sided weakness, right facial droop, and severe aphasia.  He did not receive TPA prior to or after arrival, however subsequent imaging was notable for a left M1 occlusion, as well as 70% left internal carotid stenosis and complete right common carotid occlusion with retrograde filling.  He was evaluated by neurology and neuro interventional radiology, and subsequently underwent mechanical thrombectomy and placement of a left carotid stent. He was then started on an Integrilin drip and transferred to the intensive care unit for close monitoring.  He was then loaded with dual antiplatelet therapy and the Integrilin drip was stopped.  Physical, occupational, and speech therapies were consulted.    Consultants:  Critical Care  Neurology     Interval Events:  No acute events overnight, speech has dramatically improved and he is now following commands and able to speak in broken sentences. Still with gan in place, pending removal. Otherwise no acute complaints. MRI and echo done; no hemorrhagic transformation.    Review of Systems   Unable to perform ROS: Other (broken speech)       PHYSICAL EXAM:  Vitals:    01/25/21 0300 01/25/21 0400 01/25/21 0500 01/25/21 0600   BP: 134/63 132/60 156/72 132/63   Pulse: 66 60 66 62   Resp: 20 15 15 20   Temp:  36.4 °C (97.6 °F)     TempSrc:  Temporal     SpO2: 97% 96% 95% 98%   Weight:       Height:        Body mass index is 20.23 kg/m².  Latest Vitals:  /63   Pulse 62   Temp 36.4 °C (97.6 °F) (Temporal)   Resp 20   Ht 1.88 m  "(6' 2.02\")   Wt 71.5 kg (157 lb 10.1 oz)   SpO2 98%   BMI 20.23 kg/m²   O2 therapy: Pulse Oximetry: 98 %, O2 (LPM): 1, O2 Delivery Device: None - Room Air  Vitals Range last 24h:  Temp:  [36.4 °C (97.6 °F)-36.7 °C (98 °F)] 36.4 °C (97.6 °F)  Pulse:  [58-79] 62  Resp:  [15-41] 20  BP: ()/(47-72) 132/63  SpO2:  [89 %-100 %] 98 %  Date 01/25/21 0700 - 01/26/21 0659   Shift 6364-7559 2174-1370 2821-0657 24 Hour Total   INTAKE   Shift Total       OUTPUT   Stool         Number of Times Stooled 0 x   0 x   Shift Total       NET            Intake/Output Summary (Last 24 hours) at 1/25/2021 0836  Last data filed at 1/25/2021 0600  Gross per 24 hour   Intake 1733.33 ml   Output 835 ml   Net 898.33 ml        Physical Exam   Constitutional: He is well-developed, well-nourished, and in no distress.   HENT:   Head: Normocephalic and atraumatic.   Eyes: Pupils are equal, round, and reactive to light. EOM are normal.   Cardiovascular: Normal rate and regular rhythm. Exam reveals no gallop.   No murmur heard.  Pulmonary/Chest: Effort normal and breath sounds normal. He has no wheezes. He has no rales.   Abdominal: Soft. Bowel sounds are normal. He exhibits no distension. There is no abdominal tenderness.   Musculoskeletal:         General: No edema.   Neurological:   Improving but still some expressive and mild receptive aphasia. Following simple commands without physical cueing  AnOx4  Moving all 4 extremities, 5/5 strength in all extremities  2+ patellar reflexes  Cranial nerves II through XII appear intact   Skin: Skin is warm and dry.           Recent Labs     01/23/21  2207 01/24/21  0210 01/25/21  0545   SODIUM 138 134* 137   POTASSIUM 4.2 4.7 4.4   CHLORIDE 103 102 108   CO2 22 19* 22   BUN 11 10 10   CREATININE 0.88 0.90 0.89   MAGNESIUM 2.4  --  2.0   CALCIUM 9.2 7.6* 8.4*     Recent Labs     01/23/21  2207 01/24/21  0210 01/25/21  0545   ALTSGPT 28 22  --    ASTSGOT 28 18  --    ALKPHOSPHAT 63 50  --    TBILIRUBIN " 0.4 0.5  --    GLUCOSE 88 96 98     Recent Labs     01/23/21 2207 01/24/21 0210 01/25/21  0545   RBC 4.99 4.07* 3.56*   HEMOGLOBIN 16.6 13.7* 12.1*   HEMATOCRIT 48.8 40.3* 36.0*   PLATELETCT 186 160* 127*   PROTHROMBTM 14.7*  --   --    APTT 26.1  --   --    INR 1.11  --   --      Recent Labs     01/23/21 2207 01/24/21 0210 01/25/21  0545   WBC 6.9 7.2 7.8   NEUTSPOLYS 63.40 76.10* 65.50   LYMPHOCYTES 24.10 14.80* 22.00   MONOCYTES 10.90 7.90 10.60   EOSINOPHILS 0.70 0.40 1.20   BASOPHILS 0.60 0.40 0.40   ASTSGOT 28 18  --    ALTSGPT 28 22  --    ALKPHOSPHAT 63 50  --    TBILIRUBIN 0.4 0.5  --        Meds:  • atorvastatin  80 mg     • aspirin EC  81 mg     • clopidogrel  75 mg     • therapeutic multivitamin-minerals  1 Tab     • MD Alert...Adult ICU Electrolyte Replacement per Pharmacy       • folic acid  1 mg     • thiamine  500 mg 500 mg (01/25/21 0531)    Followed by   • [START ON 1/27/2021] thiamine  100 mg     • senna-docusate  2 Tab      And   • polyethylene glycol/lytes  1 Packet      And   • magnesium hydroxide  30 mL      And   • bisacodyl  10 mg          Procedures:  None    Imaging:  MR-BRAIN-W/O   Final Result      1.  Acute small to moderate-sized left middle cerebral artery territory infarct without evidence of hemorrhagic transformation.   2.  Several scattered punctate acute infarcts in the bilateral cerebral hemispheres likely central embolic etiology.   3.  Mild diffuse cerebral substance loss.   4.  Mild microangiopathic ischemic change versus demyelination or gliosis.      MR-MRA HEAD-W/O   Final Result      MRA of the [place] within normal limits.      EC-ECHOCARDIOGRAM COMPLETE W/O CONT   Final Result      CT-HEAD W/O   Final Result      Findings consistent with early changes of left MCA territory infarction.               INTERPRETING LOCATION:  [address], [place], [address]      IR-THROMBO MECHANICAL ARTERY,INIT   Final Result      1.Occluded right common carotid artery.   *  No  intervention was performed.   2.Severe stenosis at the origin of left internal carotid artery.   *  Successful left carotid angioplasty and stent placement with embolic protection device.   3.Left M1 segment thrombus.   *  Successful mechanical thrombectomy   *  TICI 2b flow.      DX-CHEST-PORTABLE (1 VIEW)   Final Result      No acute cardiopulmonary abnormality.      CT-CTA NECK WITH & W/O-POST PROCESSING   Final Result      1.  Occlusion of the right common carotid artery just distal to its origin related intraluminal thrombus which extends from that point to the carotid bifurcation.   2.  High-grade stenosis of both internal carotid arterial origins      CT-CTA HEAD WITH & W/O-POST PROCESS   Final Result      1.  Occlusion of the M1 segment of the left middle cerebral artery.   2.  Unremarkable CT angiogram of the head otherwise.   3.  No acute abnormality of the precontrast scan.      These findings were discussed with RADHA HUMPHRIES on 1/23/2021 at 2236 hours.      CT-CEREBRAL PERFUSION ANALYSIS   Final Result      1.  Cerebral blood flow less than 30% likely representing completed infarct = 17 mL.      2.  T Max more than 6 seconds likely representing combination of completed infarct and ischemia = 138 mL.      3.  Mismatched volume likely representing ischemic brain/penumbra =  121 mL      4.  Please note that the cerebral perfusion was performed on the limited brain tissue around the basal ganglia region. Infarct/ischemia outside the CT perfusion sections can be missed in this study.      CT-HEAD W/O   Final Result      Normal CT scan of the head without contrast.               INTERPRETING LOCATION:  [address] [place] [address], [id number]          Problem and Plan:    * Cerebrovascular accident (CVA) due to occlusion of left middle cerebral artery (HCC)  Assessment & Plan  Mr. Leija presented on 1/23/2021 with complaints of right-sided facial droop and aphasia from an outside facility.  He was found to have a left M1  thrombus and underwent mechanical thrombectomy which was successful.  Additionally he was found to have left-sided carotid stenosis of 70% for which a stent was placed.  He was then started on an Integrilin drip and transferred to the intensive care unit.  He was also noted to have right common carotid artery occlusion with retrograde filling. He had some post-intervention bradycardia and hypotension likely secondary to carotid hypersensitivity after intervention. Due to timing he did not receive TPA.  -MRI 1/25/2021 showed acute left MCA infarct as well as bilateral scattered tiny embolic infarcts in both hemispheres. Echocardiography did not show any acute thrombus but did show bi-atrial enlargement consistent with possible underlying atrial fibrillation.  -Frequent neurochecks Q4H  -DAPT with clopidogrel and aspirin for 6 months  -Atorvastatin 80mg daily  -Outpatient neuroIR follow-up  -Transfer to medical floor  -Telemetry monitoring   -Will need Zio patch or other ambulatory electrocardiography  -PT/OT/SLP, may benefit from inpatient rehab. PMR consult pending  -Outpatient tobacco and alcohol cessation  -Remove Liu, voiding trial        Internal carotid artery stenosis, left  Assessment & Plan  Status post stenting for 70% with symptomatic carotid disease in the affected vessel distribution  DAPT for 6 months, follow-up outpatient with NeuroIR        Carotid occlusion, right  Assessment & Plan  Complete occlusion of the common carotid artery seen with retrograde filling on CTA of the neck, generally no intravascular intervention indicated with complete occlusion.  Medical therapy with high-dose statin and antiplatelet therapy    Prediabetes  Assessment & Plan  Borderline prediabetes with A1C of 5.9, he would benefit from a trial of outpatient lifestyle change prior to pharmacotherapy.    Tobacco abuse  Assessment & Plan   on cessation and hazards associated with continued use.     Alcohol  abuse  Assessment & Plan  Per his family, high quantities of alcohol intake, approximately 4-6 sixpacks of beer daily.  Monitor for withdrawal  Check electrolytes magnesium and phosphorus   High dose IV thiamine/MVI/folate      DISPO: Neurology floor    CODE STATUS: Full    Quality Measures:  Feeding: Regular diet  Analgesia: N/A  Sedation: N/A  Thromboprophylaxis: SCD - on DAPT  Head of bed: >30 degrees  Ulcer prophylaxis: N/A  Glycemic control: N/A  Bowel care: bowel regimen  Indwelling lines: PIV  Deescalation of antibiotics: N/A      Zach Shen M.D.

## 2021-01-26 LAB
ANION GAP SERPL CALC-SCNC: 8 MMOL/L (ref 7–16)
BASOPHILS # BLD AUTO: 0.4 % (ref 0–1.8)
BASOPHILS # BLD: 0.03 K/UL (ref 0–0.12)
BUN SERPL-MCNC: 9 MG/DL (ref 8–22)
CALCIUM SERPL-MCNC: 8.5 MG/DL (ref 8.5–10.5)
CHLORIDE SERPL-SCNC: 101 MMOL/L (ref 96–112)
CO2 SERPL-SCNC: 23 MMOL/L (ref 20–33)
CREAT SERPL-MCNC: 0.99 MG/DL (ref 0.5–1.4)
EOSINOPHIL # BLD AUTO: 0.13 K/UL (ref 0–0.51)
EOSINOPHIL NFR BLD: 1.9 % (ref 0–6.9)
ERYTHROCYTE [DISTWIDTH] IN BLOOD BY AUTOMATED COUNT: 47.2 FL (ref 35.9–50)
GLUCOSE SERPL-MCNC: 94 MG/DL (ref 65–99)
HCT VFR BLD AUTO: 36.2 % (ref 42–52)
HGB BLD-MCNC: 12.1 G/DL (ref 14–18)
IMM GRANULOCYTES # BLD AUTO: 0.03 K/UL (ref 0–0.11)
IMM GRANULOCYTES NFR BLD AUTO: 0.4 % (ref 0–0.9)
LYMPHOCYTES # BLD AUTO: 1.59 K/UL (ref 1–4.8)
LYMPHOCYTES NFR BLD: 23.4 % (ref 22–41)
MAGNESIUM SERPL-MCNC: 2.1 MG/DL (ref 1.5–2.5)
MCH RBC QN AUTO: 33.7 PG (ref 27–33)
MCHC RBC AUTO-ENTMCNC: 33.4 G/DL (ref 33.7–35.3)
MCV RBC AUTO: 100.8 FL (ref 81.4–97.8)
MONOCYTES # BLD AUTO: 0.84 K/UL (ref 0–0.85)
MONOCYTES NFR BLD AUTO: 12.4 % (ref 0–13.4)
NEUTROPHILS # BLD AUTO: 4.18 K/UL (ref 1.82–7.42)
NEUTROPHILS NFR BLD: 61.5 % (ref 44–72)
NRBC # BLD AUTO: 0 K/UL
NRBC BLD-RTO: 0 /100 WBC
PLATELET # BLD AUTO: 134 K/UL (ref 164–446)
PMV BLD AUTO: 11.2 FL (ref 9–12.9)
POTASSIUM SERPL-SCNC: 4.1 MMOL/L (ref 3.6–5.5)
RBC # BLD AUTO: 3.59 M/UL (ref 4.7–6.1)
SODIUM SERPL-SCNC: 132 MMOL/L (ref 135–145)
WBC # BLD AUTO: 6.8 K/UL (ref 4.8–10.8)

## 2021-01-26 PROCEDURE — A9270 NON-COVERED ITEM OR SERVICE: HCPCS | Performed by: STUDENT IN AN ORGANIZED HEALTH CARE EDUCATION/TRAINING PROGRAM

## 2021-01-26 PROCEDURE — 700105 HCHG RX REV CODE 258: Performed by: INTERNAL MEDICINE

## 2021-01-26 PROCEDURE — 700102 HCHG RX REV CODE 250 W/ 637 OVERRIDE(OP): Performed by: INTERNAL MEDICINE

## 2021-01-26 PROCEDURE — 700102 HCHG RX REV CODE 250 W/ 637 OVERRIDE(OP): Performed by: STUDENT IN AN ORGANIZED HEALTH CARE EDUCATION/TRAINING PROGRAM

## 2021-01-26 PROCEDURE — 83735 ASSAY OF MAGNESIUM: CPT

## 2021-01-26 PROCEDURE — 770020 HCHG ROOM/CARE - TELE (206)

## 2021-01-26 PROCEDURE — 36415 COLL VENOUS BLD VENIPUNCTURE: CPT

## 2021-01-26 PROCEDURE — 80048 BASIC METABOLIC PNL TOTAL CA: CPT

## 2021-01-26 PROCEDURE — 700111 HCHG RX REV CODE 636 W/ 250 OVERRIDE (IP): Performed by: HOSPITALIST

## 2021-01-26 PROCEDURE — A9270 NON-COVERED ITEM OR SERVICE: HCPCS | Performed by: INTERNAL MEDICINE

## 2021-01-26 PROCEDURE — 85025 COMPLETE CBC W/AUTO DIFF WBC: CPT

## 2021-01-26 PROCEDURE — 700111 HCHG RX REV CODE 636 W/ 250 OVERRIDE (IP): Performed by: INTERNAL MEDICINE

## 2021-01-26 PROCEDURE — 99232 SBSQ HOSP IP/OBS MODERATE 35: CPT | Performed by: INTERNAL MEDICINE

## 2021-01-26 RX ADMIN — FOLIC ACID 1 MG: 1 TABLET ORAL at 04:08

## 2021-01-26 RX ADMIN — MULTIPLE VITAMINS W/ MINERALS TAB 1 TABLET: TAB at 04:08

## 2021-01-26 RX ADMIN — ASPIRIN 81 MG: 81 TABLET, COATED ORAL at 04:08

## 2021-01-26 RX ADMIN — THIAMINE HYDROCHLORIDE 500 MG: 100 INJECTION, SOLUTION INTRAMUSCULAR; INTRAVENOUS at 04:08

## 2021-01-26 RX ADMIN — DOCUSATE SODIUM 50 MG AND SENNOSIDES 8.6 MG 2 TABLET: 8.6; 5 TABLET, FILM COATED ORAL at 16:52

## 2021-01-26 RX ADMIN — DOCUSATE SODIUM 50 MG AND SENNOSIDES 8.6 MG 2 TABLET: 8.6; 5 TABLET, FILM COATED ORAL at 04:08

## 2021-01-26 RX ADMIN — ATORVASTATIN CALCIUM 80 MG: 80 TABLET, FILM COATED ORAL at 16:52

## 2021-01-26 RX ADMIN — CLOPIDOGREL BISULFATE 75 MG: 75 TABLET ORAL at 04:07

## 2021-01-26 RX ADMIN — ENOXAPARIN SODIUM 40 MG: 40 INJECTION SUBCUTANEOUS at 04:08

## 2021-01-26 ASSESSMENT — ENCOUNTER SYMPTOMS
FOCAL WEAKNESS: 0
BACK PAIN: 0
MYALGIAS: 0
FLANK PAIN: 0
DEPRESSION: 0
PALPITATIONS: 0
DIAPHORESIS: 0
CHILLS: 0
LOSS OF CONSCIOUSNESS: 0
HEADACHES: 0
ABDOMINAL PAIN: 0
SPEECH CHANGE: 0
NERVOUS/ANXIOUS: 0
VOMITING: 0
WEAKNESS: 1
NAUSEA: 0
DIZZINESS: 0
FEVER: 0
SHORTNESS OF BREATH: 0
SENSORY CHANGE: 0
MEMORY LOSS: 0
COUGH: 0

## 2021-01-26 ASSESSMENT — PATIENT HEALTH QUESTIONNAIRE - PHQ9
SUM OF ALL RESPONSES TO PHQ9 QUESTIONS 1 AND 2: 0
1. LITTLE INTEREST OR PLEASURE IN DOING THINGS: NOT AT ALL
2. FEELING DOWN, DEPRESSED, IRRITABLE, OR HOPELESS: NOT AT ALL

## 2021-01-26 NOTE — FACE TO FACE
Face to Face Supporting Documentation - Home Health    The encounter with this patient was in whole or in part the primary reason for home health admission.    Date of encounter:   Patient:                    MRN:                       YOB: 2019  Sánchez Leija  [id number]  1960     Home health to see patient for:  Skilled Nursing care for assessment, interventions & education, Physical Therapy evaluation and treatment and Occupational therapy evaluation and treatment    Skilled need for:  New Onset Medical Diagnosis CVA    Skilled nursing interventions to include:  Comment: pt/ot    Homebound status evidenced by:  Need the aid of supportive devices such as crutches, canes, wheelchairs or walkers or Needs the assistance of another person in order to leave the home. Leaving home requires a considerable and taxing effort. There is a normal inability to leave the home.    Community Physician to provide follow up care: No primary care provider on file.     Optional Interventions? No      I certify the face to face encounter for this home health care referral meets the CMS requirements and the encounter/clinical assessment with the patient was, in whole, or in part, for the medical condition(s) listed above, which is the primary reason for home health care. Based on my clinical findings: the service(s) are medically necessary, support the need for home health care, and the homebound criteria are met.  I certify that this patient has had a face to face encounter by myself.  Caroline Eli D.O. - NPI: [id number]

## 2021-01-26 NOTE — PROGRESS NOTES
Brief PM&R Note    -Discussed with patient about discharge options, including inpatient rehab. Aphasia improved.   -He reports discharge support from a friend and feels safe going home when medically cleared. States he has no weakness or numbness. PT 1/25 was Min A x100 ft. Not interested in rehab or SNF  -Per patient preference, anticipate Home with HH when medically cleared        Oziel Stratton MD  Physical Medicine and Rehabilitation   1/26/2021

## 2021-01-26 NOTE — THERAPY
"Physical Therapy   Initial Evaluation     Patient Name: Sánchez Leija  Age:  60 y.o., Sex:  male  Medical Record #: [id number]  Today's Date: 1/25/2021     Precautions: Fall Risk    Assessment  Patient is 60 y.o. male that presented to acute with aphasia and R sided weakness; imaging revealed L M1 occlusion and patient is s/p thrombectomy and L carotid stenting. PMHx significant for ETOH and tobacco use. He presented to PT with impaired balance and decreased activity tolerance which are limiting his ability to safely perform mobility at PLOF. He ambulated approximately 100ft with no AD with min A; balance progressed during session. Will follow. Will defer DC recommendations to other disciplines given anticipate he will progress to PLOF with continued mobilization in house.    Plan    Recommend Physical Therapy 3 times per week until therapy goals are met for the following treatments:  Bed Mobility, Community Re-integration, Equipment, Gait Training, Manual Therapy, Neuro Re-Education / Balance, Self Care/Home Evaluation, Stair Training, Therapeutic Activities and Therapeutic Exercises    DC Equipment Recommendations: Unable to determine at this time  Discharge Recommendations: anticipate he will progress to safe level for home       Subjective    \"Right on.\"     Objective       01/25/21 1626   Total Time Spent   Total Time Spent (Mins) 20   Charge Group   PT Evaluation PT Evaluation Low   Initial Contact Note    Initial Contact Note Order Received and Verified, Physical Therapy Evaluation in Progress with Full Report to Follow.   Precautions   Precautions Fall Risk   Vitals   O2 (LPM) 0   O2 Delivery Device None - Room Air   Pain 0 - 10 Group   Therapist Pain Assessment   (not able to verbalize, no indication of pain)   Prior Living Situation   Prior Services Unable To Determine At This Time   Comments Patient aphasic and unable to provide complete social or PLOF, reported he lives in [place] with roommate   Prior " "Level of Functional Mobility   Bed Mobility Independent   Transfer Status Independent   Ambulation Independent   Distance Ambulation (Feet)   (community)   Assistive Devices Used None   Stairs Independent   Comments Above assumed based on presentation   History of Falls   History of Falls   (unable to ascertain)   Cognition    Cognition / Consciousness X   Speech/ Communication Expressive Aphasia   Level of Consciousness Alert   Safety Awareness Impaired   New Learning Impaired   Comments pleasant, cooperative, said \"yeah\" in response to most questions/commands   Passive ROM Lower Body   Passive ROM Lower Body WDL   Comments not formally tested, WFL for mobility   Active ROM Lower Body    Active ROM Lower Body  WDL   Comments as abvoe   Strength Lower Body   Lower Body Strength  WDL   Comments as above   Sensation Lower Body   Lower Extremity Sensation   Not Tested   Lower Body Muscle Tone   Lower Body Muscle Tone  WDL   Neurological Concerns   Neurological Concerns Yes   Comments given med dx and presentation   Coordination Lower Body    Coordination Lower Body  WDL   Balance Assessment   Sitting Balance (Static) Fair +   Sitting Balance (Dynamic) Fair +   Standing Balance (Static) Fair   Standing Balance (Dynamic) Fair -   Weight Shift Sitting Fair   Weight Shift Standing Fair   Comments minimal LOB initially, progressed during session; no AD   Gait Analysis   Gait Level Of Assist Minimal Assist   Assistive Device None   Distance (Feet) 100   # of Times Distance was Traveled 1   Deviation   (inconsistent JOEL and step length)   # of Stairs Climbed 0   Weight Bearing Status no restrictions   Vision Deficits Impacting Mobility NT   Bed Mobility    Supine to Sit Supervised   Sit to Supine Supervised   Scooting Supervised   Functional Mobility   Sit to Stand Supervised   Bed, Chair, Wheelchair Transfer Minimal Assist   Transfer Method Stand Step   How much difficulty does the patient currently have...   Turning over in " bed (including adjusting bedclothes, sheets and blankets)? 4   Sitting down on and standing up from a chair with arms (e.g., wheelchair, bedside commode, etc.) 3   Moving from lying on back to sitting on the side of the bed? 3   How much help from another person does the patient currently need...   Moving to and from a bed to a chair (including a wheelchair)? 3   Need to walk in a hospital room? 3   Climbing 3-5 steps with a railing? 3   6 clicks Mobility Score 19   Activity Tolerance   Sitting in Chair NT   Sitting Edge of Bed 6 min   Standing 6 min   Comments no overt pain, fatigue, SOB, dizziness   Edema / Skin Assessment   Edema / Skin  Not Assessed   Short Term Goals    Short Term Goal # 1 Patient will ambulate 500ft with supervision within 6tx in order to access environment   Short Term Goal # 2 Patient will ascend/descend 1 step with supervision within 6tx in order to navigate curb step   Education Group   Education Provided Role of Physical Therapist   Role of Physical Therapist Patient Response Patient;Acceptance;Explanation;Reinforcement Needed   Problem List    Problems Impaired Balance;Decreased Activity Tolerance;Safety Awareness Deficits / Cognition;Impaired Ambulation   Anticipated Discharge Equipment and Recommendations   DC Equipment Recommendations Unable to determine at this time   Discharge Recommendations Recommend post-acute placement for additional physical therapy services prior to discharge home  (anticipate he will progress to safe level for home)   Interdisciplinary Plan of Care Collaboration   IDT Collaboration with  Nursing;Occupational Therapist   Patient Position at End of Therapy In Bed;Bed Alarm On;Call Light within Reach;Tray Table within Reach   Collaboration Comments RN aware of visit, response   Session Information   Date / Session Number  1/25-1 (1/3, 1/31)   Priority 2

## 2021-01-26 NOTE — THERAPY
"Occupational Therapy   Initial Evaluation     Patient Name: Sánchez Leija  Age:  60 y.o., Sex:  male  Medical Record #: [id number]  Today's Date: 1/25/2021     Precautions  Precautions: Fall Risk    Assessment  Patient is 60 y.o. male who presents to acute  W/ aphasia and R sided weakness. Pt is now s/p thrombectomy and L carotid stenting. PMH includes ETOH. Pt primarily limited by aphasia, however followed commands appropriately. Pt demo'd impaired cognition, functional mobility and activity tolerance impacting functional independence.     Plan    Recommend Occupational Therapy 3 times per week until therapy goals are met for the following treatments:  Adaptive Equipment, Neuro Re-Education / Balance, Self Care/Activities of Daily Living, Therapeutic Activities and Therapeutic Exercises.    DC Equipment Recommendations: (P) Unable to determine at this time  Discharge Recommendations: (P) Recommend post-acute placement for additional occupational therapy services prior to discharge home     Subjective    \"Right on\"     Objective       01/25/21 1625   Total Time Spent   Total Time Spent (Mins) 20   Charge Group   OT Evaluation OT Evaluation Low   Initial Contact Note    Initial Contact Note Order Received and Verified, Occupational Therapy Evaluation in Progress with Full Report to Follow.   Prior Living Situation   Prior Services Home-Independent   Lives with - Patient's Self Care Capacity Unrelated Adult   Comments Pt aphasic, unable to provide PLOF info, appears to live in [place] with a roommate   Prior Level of ADL Function   Self Feeding Independent   Grooming / Hygiene Independent   Bathing Independent   Dressing Independent   Toileting Independent   Prior Level of IADL Function   Occupation (Pre-Hospital Vocational) Unable To Determine At This Time   Precautions   Precautions Fall Risk   Vitals   O2 (LPM) 0   O2 Delivery Device None - Room Air   Pain 0 - 10 Group   Therapist Pain Assessment Post Activity Pain " Same as Prior to Activity;Nurse Notified  (no c/o or indications of pain)   Cognition    Cognition / Consciousness WDL   Level of Consciousness Alert   Comments pleasent and coopertive   Active ROM Upper Body   Active ROM Upper Body  WDL   Strength Upper Body   Comments appears WFL during ADLs   Coordination Upper Body   Coordination WDL   Balance Assessment   Sitting Balance (Static) Fair   Sitting Balance (Dynamic) Fair -   Standing Balance (Static) Fair -   Standing Balance (Dynamic) Fair -   Weight Shift Sitting Fair   Weight Shift Standing Fair   Comments no AD, noted posterior lean   Bed Mobility    Supine to Sit Minimal Assist   Sit to Supine Supervised   Scooting Supervised   ADL Assessment   Grooming Minimal Assist;Standing  (washed face at sink)   Upper Body Dressing Minimal Assist   Lower Body Dressing Minimal Assist   How much help from another person does the patient currently need...   Putting on and taking off regular lower body clothing? 3   Bathing (including washing, rinsing, and drying)? 3   Toileting, which includes using a toilet, bedpan, or urinal? 3   Putting on and taking off regular upper body clothing? 3   Taking care of personal grooming such as brushing teeth? 3   Eating meals? 4   6 Clicks Daily Activity Score 19   Functional Mobility   Sit to Stand Supervised   Bed, Chair, Wheelchair Transfer Minimal Assist   Mobility household distance, no AD    Activity Tolerance   Sitting in Chair NT   Sitting Edge of Bed 6 min   Standing 8 min   Patient / Family Goals   Patient / Family Goal #1 unable to state   Short Term Goals   Short Term Goal # 1 Pt will demo LB dressing w/ SPV   Short Term Goal # 2 Pt will demo standing grooming w/ SPV   Short Term Goal # 3 Pt will demo toilet txf w/ SPV   Education Group   Role of Occupational Therapist Patient Response Patient;Acceptance;Explanation;Demonstration;Verbal Demonstration   Problem List   Problem List Decreased Active Daily Living Skills;Decreased  Homemaking Skills;Decreased Functional Mobility;Decreased Activity Tolerance;Impaired Postural Control / Balance;Safety Awareness Deficits / Cognition   Anticipated Discharge Equipment and Recommendations   DC Equipment Recommendations Unable to determine at this time   Discharge Recommendations Recommend post-acute placement for additional occupational therapy services prior to discharge home   Interdisciplinary Plan of Care Collaboration   IDT Collaboration with  Nursing;Physical Therapist;Physician   Patient Position at End of Therapy In Bed;Call Light within Reach;Tray Table within Reach;Phone within Reach   Collaboration Comments reoirt given   Session Information   Date / Session Number  1/25, 1 (1/3, 1/31)   Priority 3

## 2021-01-26 NOTE — DISCHARGE PLANNING
"Care Transition Team Assessment    LSW met with pt at bedside to complete assessment. Unable to assess A&O as pt is experiencing expressive aphasia. LSW made phone call to pt's father, Joss to verify information that pt provided.    Joss reported that pt's physical home address is [address] [place] [address] [address] [place] [address]. Pt lives on the second story of a duplex with his friend Ajay Castrejon [phone]. There is no DME in the home. Prior to hospitalization pt was independent with all ADLs and IADLs. Pt managed his own medications and is able to drive himself. 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 Pt has been doing various physical labor for work such as shoveling, cutting wood, etc and has no financial concerns at this time.    Pt reported that he does have a history of substance abuse. Joss stated that pt drinks a \"six-pack a day after work\". There are no MH concerns at this time. Joss reported that pt has an abundance of social support in [place] as everyone in town seems to know and love him. Joss stated that his biggest support is the pt's best friend Shane Kobi [phone].    Joss reported that Shane will be able to pick pt up from the hospital upon DC. Joss stated he is going to call Shane and request that he call this LSW in the morning to provide any additional needed information and arrange transportation.    Pt reported that his PCP is Dr. Guo in [place]. Joss was unsure of pt's PCP and was not able to verify.    Information Source  Orientation : Unable to Assess  Information Given By: Patient  Informant's Name: Sánchez Leija  Who is responsible for making decisions for patient? : Patient    Readmission Evaluation  Is this a readmission?: No    Elopement Risk  Legal Hold: No  Ambulatory or Self Mobile in Wheelchair: No-Not an Elopement Risk  Elopement Risk: Not at Risk for Elopement    Interdisciplinary Discharge Planning  Lives with - " "Patient's Self Care Capacity: Unrelated Adult  Prior Services: Unable To Determine At This Time    Discharge Preparedness  What is your plan after discharge?: Home with help  What are your discharge supports?: Other (comment)(Friend/roommate \"Dev\")  Prior Functional Level: Ambulatory, Drives Self, Independent with Activities of Daily Living, Independent with Medication Management  Difficulity with ADLs: None  Difficulity with IADLs: None    Functional Assesment  Prior Functional Level: Ambulatory, Drives Self, Independent with Activities of Daily Living, Independent with Medication Management    Finances  Financial Barriers to Discharge: No  Prescription Coverage: Yes    Domestic Abuse  Have you ever been the victim of abuse or violence?: No    Psychological Assessment  History of Substance Abuse: Alcohol  History of Psychiatric Problems: No  Non-compliant with Treatment: No  Newly Diagnosed Illness: Yes    Discharge Risks or Barriers  Discharge risks or barriers?: Substance abuse  Patient risk factors: Substance abuse    Anticipated Discharge Information  Discharge Disposition: Still a Patient (30)  "

## 2021-01-26 NOTE — PROGRESS NOTES
[facility] Medicine Daily Progress Note    Date of Service  1/26/2021    Chief Complaint  60 y.o. male admitted 1/23/2021 with CVA s/pp thrombectomy and stent placement.    Hospital Course    Sánchez Leija is a 60 y.o. male with a past medical history of heavy alcohol tobacco use who presented on 1/23/2021 from an outside facility with complaints of right-sided weakness, right facial droop, and severe aphasia.  He did not receive TPA prior to or after arrival, however subsequent imaging was notable for a left M1 occlusion, as well as 70% left internal carotid stenosis and complete right common carotid occlusion with retrograde filling.  He was evaluated by neurology and neuro interventional radiology, and subsequently underwent mechanical thrombectomy and placement of a left carotid stent. He was then started on an Integrilin drip and transferred to the intensive care unit for close monitoring.  He was then loaded with dual antiplatelet therapy and the Integrilin drip was stopped.        Interval Problem Update    Seen by pt/ot min assist  Tolerating oral diet  Cont cardiac monitoring    Consultants/Specialty  Neuro  IR  intensivist    Code Status  Full Code    Disposition  To home in 1-2 days with 24/7 assistance  Family may be able to provide transport to [facility], friend Shane crook    Review of Systems  Review of Systems   Constitutional: Negative for chills, diaphoresis, fever and malaise/fatigue.   HENT: Negative for congestion and hearing loss.    Respiratory: Negative for cough and shortness of breath.    Cardiovascular: Negative for chest pain, palpitations and leg swelling.   Gastrointestinal: Negative for abdominal pain, nausea and vomiting.   Genitourinary: Negative for dysuria and flank pain.   Musculoskeletal: Negative for back pain, joint pain and myalgias.   Neurological: Positive for weakness. Negative for dizziness, sensory change, speech change, focal weakness, loss of consciousness and  headaches.   Psychiatric/Behavioral: Negative for depression and memory loss. The patient is not nervous/anxious.         Physical Exam  Temp:  [36.6 °C (97.8 °F)-37.3 °C (99.1 °F)] 37.3 °C (99.1 °F)  Pulse:  [63-73] 67  Resp:  [16-18] 18  BP: (105-143)/(58-88) 111/63  SpO2:  [95 %-99 %] 98 %    Physical Exam  Vitals signs and nursing note reviewed.   Constitutional:       General: He is not in acute distress.     Appearance: He is not toxic-appearing or diaphoretic.   HENT:      Head: Normocephalic and atraumatic.      Nose: Nose normal.   Eyes:      General:         Right eye: No discharge.         Left eye: No discharge.      Pupils: Pupils are equal, round, and reactive to light.   Neck:      Musculoskeletal: Neck supple.      Thyroid: No thyromegaly.      Vascular: No JVD.   Cardiovascular:      Rate and Rhythm: Normal rate.      Heart sounds: No murmur.   Pulmonary:      Effort: Pulmonary effort is normal. No respiratory distress.      Breath sounds: No wheezing.   Abdominal:      General: Bowel sounds are normal. There is no distension.      Palpations: Abdomen is soft. There is no mass.      Tenderness: There is no abdominal tenderness.   Musculoskeletal:         General: No swelling or tenderness.   Skin:     General: Skin is warm and dry.      Findings: No erythema or rash.   Neurological:      Mental Status: He is alert and oriented to person, place, and time.      Cranial Nerves: No cranial nerve deficit.      Sensory: No sensory deficit.      Motor: Weakness present.      Coordination: Coordination normal.   Psychiatric:         Behavior: Behavior normal.         Thought Content: Thought content normal.         Fluids    Intake/Output Summary (Last 24 hours) at 1/26/2021 1603  Last data filed at 1/26/2021 1124  Gross per 24 hour   Intake --   Output 300 ml   Net -300 ml       Laboratory  Recent Labs     01/24/21  0210 01/25/21  0545 01/26/21  0623   WBC 7.2 7.8 6.8   RBC 4.07* 3.56* 3.59*   HEMOGLOBIN  13.7* 12.1* 12.1*   HEMATOCRIT 40.3* 36.0* 36.2*   MCV 99.0* 101.1* 100.8*   MCH 33.7* 34.0* 33.7*   MCHC 34.0 33.6* 33.4*   RDW 47.8 49.1 47.2   PLATELETCT 160* 127* 134*   MPV 10.0 10.4 11.2     Recent Labs     01/24/21  0210 01/25/21  0545 01/26/21  0623   SODIUM 134* 137 132*   POTASSIUM 4.7 4.4 4.1   CHLORIDE 102 108 101   CO2 19* 22 23   GLUCOSE 96 98 94   BUN 10 10 9   CREATININE 0.90 0.89 0.99   CALCIUM 7.6* 8.4* 8.5     Recent Labs     01/23/21  2207   APTT 26.1   INR 1.11         Recent Labs     01/24/21  0210   TRIGLYCERIDE 162*   HDL 37*   LDL 80       Imaging  OUTSIDE IMAGES-CT HEAD   Final Result      MR-BRAIN-W/O   Final Result      1.  Acute small to moderate-sized left middle cerebral artery territory infarct without evidence of hemorrhagic transformation.   2.  Several scattered punctate acute infarcts in the bilateral cerebral hemispheres likely central embolic etiology.   3.  Mild diffuse cerebral substance loss.   4.  Mild microangiopathic ischemic change versus demyelination or gliosis.      MR-MRA HEAD-W/O   Final Result      MRA of the [facility] [place] within normal limits.      EC-ECHOCARDIOGRAM COMPLETE W/O CONT   Final Result      CT-HEAD W/O   Final Result      Findings consistent with early changes of left MCA territory infarction.               INTERPRETING LOCATION:  [address] [place] [address], [facility]      IR-THROMBO MECHANICAL ARTERY,INIT   Final Result      1.Occluded right common carotid artery.   *  No intervention was performed.   2.Severe stenosis at the origin of left internal carotid artery.   *  Successful left carotid angioplasty and stent placement with embolic protection device.   3.Left M1 segment thrombus.   *  Successful mechanical thrombectomy   *  TICI 2b flow.      DX-CHEST-PORTABLE (1 VIEW)   Final Result      No acute cardiopulmonary abnormality.      CT-CTA NECK WITH & W/O-POST PROCESSING   Final Result      1.  Occlusion of the right common carotid artery just distal to  its origin related intraluminal thrombus which extends from that point to the carotid bifurcation.   2.  High-grade stenosis of both internal carotid arterial origins      CT-CTA HEAD WITH & W/O-POST PROCESS   Final Result      1.  Occlusion of the M1 segment of the left middle cerebral artery.   2.  Unremarkable CT angiogram of the head otherwise.   3.  No acute abnormality of the precontrast scan.      These findings were discussed with RADHA HUMPHRIES on 1/23/2021 at 2236 hours.      CT-CEREBRAL PERFUSION ANALYSIS   Final Result      1.  Cerebral blood flow less than 30% likely representing completed infarct = 17 mL.      2.  T Max more than 6 seconds likely representing combination of completed infarct and ischemia = 138 mL.      3.  Mismatched volume likely representing ischemic brain/penumbra =  121 mL      4.  Please note that the cerebral perfusion was performed on the limited brain tissue around the basal ganglia region. Infarct/ischemia outside the CT perfusion sections can be missed in this study.      CT-HEAD W/O   Final Result      Normal CT scan of the head without contrast.               INTERPRETING LOCATION:  [address] [place] [address], [facility], [facility]           Assessment/Plan  * Cerebrovascular accident (CVA) due to occlusion of left middle cerebral artery (HCC)  Assessment & Plan  Mr. Leija presented on 1/23/2021 with complaints of right-sided facial droop and aphasia from an outside facility.  He was found to have a left M1 thrombus and underwent mechanical thrombectomy which was successful.  Additionally he was found to have left-sided carotid stenosis of 70% for which a stent was placed.  He was then started on an Integrilin drip and transferred to the intensive care unit.  He was also noted to have right common carotid artery occlusion with retrograde filling. He had some post-intervention bradycardia and hypotension likely secondary to carotid hypersensitivity after intervention. Due to timing he did not  receive TPA.  -MRI 1/25/2021 showed acute left MCA infarct as well as bilateral scattered tiny embolic infarcts in both hemispheres. Echocardiography did not show any acute thrombus but did show bi-atrial enlargement consistent with possible underlying atrial fibrillation.  -Frequent neurochecks Q4H  -DAPT with clopidogrel and aspirin for 6 months  -Atorvastatin 80mg daily  -Outpatient neuroIR follow-up  -Transfer to medical floor  -Telemetry monitoring   -Will need Zio patch or other ambulatory electrocardiography, recommend this is obtained by primary care provider    1/26   -PT/OT/SLP, minimal assist  Patient reports having 24/ 7 assistance with  Roommates  Current physiatry, no rehab needs  Continue cardiac not monitoring, outpatient follow-up with primary care provider for referral for cardiac monitoring          -Outpatient tobacco and alcohol cessation          Internal carotid artery stenosis, left  Assessment & Plan  Status post stenting for 70% with symptomatic carotid disease in the affected vessel distribution  DAPT for 6 months, follow-up outpatient with NeuroIR        Carotid occlusion, right  Assessment & Plan  Complete occlusion of the common carotid artery seen with retrograde filling on CTA of the neck, generally no intravascular intervention indicated with complete occlusion.  Medical therapy with high-dose statin and antiplatelet therapy    Prediabetes  Assessment & Plan  Borderline prediabetes with A1C of 5.9, he would benefit from a trial of outpatient lifestyle change prior to pharmacotherapy.    Tobacco abuse  Assessment & Plan   on cessation and hazards associated with continued use.     Alcohol abuse  Assessment & Plan  Per his family, high quantities of alcohol intake, approximately 4-6 sixpacks of beer daily.  Monitor for withdrawal  Check electrolytes magnesium and phosphorus   High dose IV thiamine/MVI/folate       VTE prophylaxis: lovenox

## 2021-01-27 ENCOUNTER — APPOINTMENT (OUTPATIENT)
Dept: RADIOLOGY | Facility: MEDICAL CENTER | Age: 61
DRG: 024 | End: 2021-01-27
Attending: INTERNAL MEDICINE
Payer: COMMERCIAL

## 2021-01-27 LAB
ANION GAP SERPL CALC-SCNC: 12 MMOL/L (ref 7–16)
BASOPHILS # BLD AUTO: 0.6 % (ref 0–1.8)
BASOPHILS # BLD: 0.04 K/UL (ref 0–0.12)
BUN SERPL-MCNC: 9 MG/DL (ref 8–22)
CALCIUM SERPL-MCNC: 8.6 MG/DL (ref 8.5–10.5)
CHLORIDE SERPL-SCNC: 102 MMOL/L (ref 96–112)
CO2 SERPL-SCNC: 22 MMOL/L (ref 20–33)
CREAT SERPL-MCNC: 0.94 MG/DL (ref 0.5–1.4)
EOSINOPHIL # BLD AUTO: 0.17 K/UL (ref 0–0.51)
EOSINOPHIL NFR BLD: 2.4 % (ref 0–6.9)
ERYTHROCYTE [DISTWIDTH] IN BLOOD BY AUTOMATED COUNT: 46.8 FL (ref 35.9–50)
GLUCOSE SERPL-MCNC: 82 MG/DL (ref 65–99)
HCT VFR BLD AUTO: 37.2 % (ref 42–52)
HGB BLD-MCNC: 12.5 G/DL (ref 14–18)
IMM GRANULOCYTES # BLD AUTO: 0.03 K/UL (ref 0–0.11)
IMM GRANULOCYTES NFR BLD AUTO: 0.4 % (ref 0–0.9)
LYMPHOCYTES # BLD AUTO: 1.91 K/UL (ref 1–4.8)
LYMPHOCYTES NFR BLD: 27.3 % (ref 22–41)
MAGNESIUM SERPL-MCNC: 2 MG/DL (ref 1.5–2.5)
MCH RBC QN AUTO: 33.4 PG (ref 27–33)
MCHC RBC AUTO-ENTMCNC: 33.6 G/DL (ref 33.7–35.3)
MCV RBC AUTO: 99.5 FL (ref 81.4–97.8)
MONOCYTES # BLD AUTO: 0.7 K/UL (ref 0–0.85)
MONOCYTES NFR BLD AUTO: 10 % (ref 0–13.4)
NEUTROPHILS # BLD AUTO: 4.14 K/UL (ref 1.82–7.42)
NEUTROPHILS NFR BLD: 59.3 % (ref 44–72)
NRBC # BLD AUTO: 0 K/UL
NRBC BLD-RTO: 0 /100 WBC
PLATELET # BLD AUTO: 143 K/UL (ref 164–446)
PMV BLD AUTO: 11.3 FL (ref 9–12.9)
POTASSIUM SERPL-SCNC: 3.9 MMOL/L (ref 3.6–5.5)
RBC # BLD AUTO: 3.74 M/UL (ref 4.7–6.1)
SODIUM SERPL-SCNC: 136 MMOL/L (ref 135–145)
WBC # BLD AUTO: 7 K/UL (ref 4.8–10.8)

## 2021-01-27 PROCEDURE — 97535 SELF CARE MNGMENT TRAINING: CPT | Mod: CO

## 2021-01-27 PROCEDURE — 36415 COLL VENOUS BLD VENIPUNCTURE: CPT

## 2021-01-27 PROCEDURE — A9270 NON-COVERED ITEM OR SERVICE: HCPCS | Performed by: INTERNAL MEDICINE

## 2021-01-27 PROCEDURE — 83735 ASSAY OF MAGNESIUM: CPT

## 2021-01-27 PROCEDURE — 80048 BASIC METABOLIC PNL TOTAL CA: CPT

## 2021-01-27 PROCEDURE — 700102 HCHG RX REV CODE 250 W/ 637 OVERRIDE(OP): Performed by: INTERNAL MEDICINE

## 2021-01-27 PROCEDURE — 700102 HCHG RX REV CODE 250 W/ 637 OVERRIDE(OP): Performed by: STUDENT IN AN ORGANIZED HEALTH CARE EDUCATION/TRAINING PROGRAM

## 2021-01-27 PROCEDURE — 85025 COMPLETE CBC W/AUTO DIFF WBC: CPT

## 2021-01-27 PROCEDURE — 770020 HCHG ROOM/CARE - TELE (206)

## 2021-01-27 PROCEDURE — 96105 ASSESSMENT OF APHASIA: CPT

## 2021-01-27 PROCEDURE — 70450 CT HEAD/BRAIN W/O DYE: CPT

## 2021-01-27 PROCEDURE — 99233 SBSQ HOSP IP/OBS HIGH 50: CPT | Performed by: INTERNAL MEDICINE

## 2021-01-27 PROCEDURE — 97116 GAIT TRAINING THERAPY: CPT

## 2021-01-27 PROCEDURE — A9270 NON-COVERED ITEM OR SERVICE: HCPCS | Performed by: STUDENT IN AN ORGANIZED HEALTH CARE EDUCATION/TRAINING PROGRAM

## 2021-01-27 PROCEDURE — 700111 HCHG RX REV CODE 636 W/ 250 OVERRIDE (IP): Performed by: HOSPITALIST

## 2021-01-27 RX ADMIN — Medication 100 MG: at 05:09

## 2021-01-27 RX ADMIN — ENOXAPARIN SODIUM 40 MG: 40 INJECTION SUBCUTANEOUS at 05:08

## 2021-01-27 RX ADMIN — CLOPIDOGREL BISULFATE 75 MG: 75 TABLET ORAL at 05:08

## 2021-01-27 RX ADMIN — ASPIRIN 81 MG: 81 TABLET, COATED ORAL at 05:09

## 2021-01-27 RX ADMIN — MULTIPLE VITAMINS W/ MINERALS TAB 1 TABLET: TAB at 05:08

## 2021-01-27 RX ADMIN — FOLIC ACID 1 MG: 1 TABLET ORAL at 05:08

## 2021-01-27 RX ADMIN — ATORVASTATIN CALCIUM 80 MG: 80 TABLET, FILM COATED ORAL at 16:46

## 2021-01-27 ASSESSMENT — ENCOUNTER SYMPTOMS
EYE PAIN: 0
BLURRED VISION: 1
DIZZINESS: 0
MEMORY LOSS: 0
EYE DISCHARGE: 0
ABDOMINAL PAIN: 0
DEPRESSION: 0
PHOTOPHOBIA: 0
FOCAL WEAKNESS: 0
CHILLS: 0
NERVOUS/ANXIOUS: 0
NAUSEA: 0
BACK PAIN: 0
DOUBLE VISION: 0
MYALGIAS: 0
WEAKNESS: 1
COUGH: 0
PALPITATIONS: 0
SHORTNESS OF BREATH: 0
FLANK PAIN: 0
LOSS OF CONSCIOUSNESS: 0
FEVER: 0

## 2021-01-27 ASSESSMENT — COGNITIVE AND FUNCTIONAL STATUS - GENERAL
HELP NEEDED FOR BATHING: A LITTLE
SUGGESTED CMS G CODE MODIFIER DAILY ACTIVITY: CI
DAILY ACTIVITIY SCORE: 23
MOVING FROM LYING ON BACK TO SITTING ON SIDE OF FLAT BED: A LITTLE
SUGGESTED CMS G CODE MODIFIER MOBILITY: CI
MOBILITY SCORE: 23

## 2021-01-27 ASSESSMENT — GAIT ASSESSMENTS
GAIT LEVEL OF ASSIST: SUPERVISED
DISTANCE (FEET): 250
DEVIATION: OTHER (COMMENT)

## 2021-01-27 NOTE — THERAPY
Physical Therapy   Daily Treatment     Patient Name: Sánchez Leija  Age:  60 y.o., Sex:  male  Medical Record #: [id number]  Today's Date: 1/27/2021     Precautions: Fall Risk    Assessment    Patient demonstrates community level functional mobility, with fair insight into condition.  Educated patient on stair ambulation and importance of continued physical therapy.  Patient receptive to education.  Reports new onset R visual deficits, believe that it may be cataract.  Patient able to navigate busy environment, compensating with use of left eye and cervical ROM.  Patient left standing next to bed, with physicians in room talking to patient.  PT will no longer follow.      01/27/21 1105   Precautions   Precautions Fall Risk   Cognition    Cognition / Consciousness X   Speech/ Communication Expressive Aphasia   Level of Consciousness Alert   Comments delayed but appropriate verbal responses   Balance   Sitting Balance (Static) Fair +   Sitting Balance (Dynamic) Fair +   Standing Balance (Static) Fair +   Standing Balance (Dynamic) Fair   Weight Shift Sitting Good   Weight Shift Standing Fair   Skilled Intervention Verbal Cuing   Gait Analysis   Gait Level Of Assist Supervised   Assistive Device None   Distance (Feet) 250   # of Times Distance was Traveled 1   Deviation Other (Comment)  (inconsistent step length, but steady)   # of Stairs Climbed 4   Level of Assist with Stairs Supervised   Weight Bearing Status fwb   Bed Mobility    Supine to Sit Supervised   Sit to Supine Supervised   Scooting Supervised   Functional Mobility   Sit to Stand Supervised   How much difficulty does the patient currently have...   Turning over in bed (including adjusting bedclothes, sheets and blankets)? 4   Sitting down on and standing up from a chair with arms (e.g., wheelchair, bedside commode, etc.) 3   Moving from lying on back to sitting on the side of the bed? 4   How much help from another person does the patient currently need...    Moving to and from a bed to a chair (including a wheelchair)? 4   Need to walk in a hospital room? 4   Climbing 3-5 steps with a railing? 4   6 clicks Mobility Score 23   Short Term Goals    Short Term Goal # 1 Patient will ambulate 200ft with supervision within 6tx in order to access environment   Goal Outcome # 1 Goal met   Short Term Goal # 2 Patient will ascend/descend 1 step with supervision within 6tx in order to navigate curb step   Goal Outcome # 2 Goal met   Education Group   Education Provided Role of Physical Therapist   Role of Physical Therapist Patient Response Patient;Acceptance;Explanation;Reinforcement Needed   Additional Comments stair/gait training as well; patient demonstrated good learning   Anticipated Discharge Equipment and Recommendations   DC Equipment Recommendations None     Plan    Discharge secondary to goals met.    DC Equipment Recommendations: None  Discharge Recommendations: Recommend home health for continued physical therapy services

## 2021-01-27 NOTE — PROGRESS NOTES
Hospital Medicine Daily Progress Note    Date of Service  1/27/2021    Chief Complaint  60 y.o. male admitted 1/23/2021 with CVA s/pp thrombectomy and stent placement.    Hospital Course    Sánchez Leija is a 60 y.o. male with a past medical history of heavy alcohol tobacco use who presented on 1/23/2021 from an outside facility with complaints of right-sided weakness, right facial droop, and severe aphasia.  He did not receive TPA prior to or after arrival, however subsequent imaging was notable for a left M1 occlusion, as well as 70% left internal carotid stenosis and complete right common carotid occlusion with retrograde filling.  He was evaluated by neurology and neuro interventional radiology, and subsequently underwent mechanical thrombectomy and placement of a left carotid stent. He was then started on an Integrilin drip and transferred to the intensive care unit for close monitoring.  He was then loaded with dual antiplatelet therapy and the Integrilin drip was stopped.        Interval Problem Update    Reports prior h/o right peripheral visual field loss  Ambulated w/o assistance with therapy    Consultants/Specialty  Neuro  IR  intensivist    Code Status  Full Code    Disposition  To home in 1-2 days with 24/7 assistance  Family may be able to provide transport to [facility], friend Shane crook vs home with friend in [facility] following  Home health pending    Review of Systems  Review of Systems   Constitutional: Negative for chills, fever and malaise/fatigue.   HENT: Negative for congestion.    Eyes: Positive for blurred vision. Negative for double vision, photophobia, pain and discharge.   Respiratory: Negative for cough and shortness of breath.    Cardiovascular: Negative for palpitations and leg swelling.   Gastrointestinal: Negative for abdominal pain and nausea.   Genitourinary: Negative for dysuria and flank pain.   Musculoskeletal: Negative for back pain, joint pain and myalgias.   Neurological:  Positive for weakness. Negative for dizziness, focal weakness and loss of consciousness.   Psychiatric/Behavioral: Negative for depression and memory loss. The patient is not nervous/anxious.         Physical Exam  Temp:  [36.1 °C (97 °F)-36.9 °C (98.4 °F)] 36.7 °C (98 °F)  Pulse:  [59-71] 62  Resp:  [16-20] 20  BP: (100-139)/(54-76) 121/61  SpO2:  [94 %-97 %] 97 %    Physical Exam  Vitals signs and nursing note reviewed.   Constitutional:       General: He is not in acute distress.     Appearance: He is not diaphoretic.   HENT:      Head: Normocephalic and atraumatic.      Nose: Nose normal.   Eyes:      General:         Right eye: No discharge.         Left eye: No discharge.      Pupils: Pupils are equal, round, and reactive to light.      Comments: Right visual field loss   Neck:      Musculoskeletal: Neck supple.      Thyroid: No thyromegaly.      Vascular: No JVD.   Cardiovascular:      Rate and Rhythm: Normal rate.      Heart sounds: No murmur.   Pulmonary:      Effort: Pulmonary effort is normal. No respiratory distress.      Breath sounds: No wheezing.   Abdominal:      General: Bowel sounds are normal. There is no distension.      Palpations: Abdomen is soft.      Tenderness: There is no abdominal tenderness.   Musculoskeletal:         General: No swelling or tenderness.   Skin:     General: Skin is warm and dry.   Neurological:      Mental Status: He is alert and oriented to person, place, and time.      Cranial Nerves: No cranial nerve deficit.      Sensory: No sensory deficit.      Motor: Weakness present.   Psychiatric:         Behavior: Behavior normal.         Thought Content: Thought content normal.         Fluids    Intake/Output Summary (Last 24 hours) at 1/27/2021 1432  Last data filed at 1/27/2021 0800  Gross per 24 hour   Intake --   Output 600 ml   Net -600 ml       Laboratory  Recent Labs     01/25/21  0545 01/26/21  0623 01/27/21  0341   WBC 7.8 6.8 7.0   RBC 3.56* 3.59* 3.74*   HEMOGLOBIN  12.1* 12.1* 12.5*   HEMATOCRIT 36.0* 36.2* 37.2*   .1* 100.8* 99.5*   MCH 34.0* 33.7* 33.4*   MCHC 33.6* 33.4* 33.6*   RDW 49.1 47.2 46.8   PLATELETCT 127* 134* 143*   MPV 10.4 11.2 11.3     Recent Labs     01/25/21  0545 01/26/21  0623 01/27/21  0341   SODIUM 137 132* 136   POTASSIUM 4.4 4.1 3.9   CHLORIDE 108 101 102   CO2 22 23 22   GLUCOSE 98 94 82   BUN 10 9 9   CREATININE 0.89 0.99 0.94   CALCIUM 8.4* 8.5 8.6                   Imaging  CT-HEAD W/O   Final Result      1.  Negative for hemorrhage      2.  Developing left middle cerebral artery infarct with mild left frontotemporal mass effect without shift      OUTSIDE IMAGES-CT HEAD   Final Result      MR-BRAIN-W/O   Final Result      1.  Acute small to moderate-sized left middle cerebral artery territory infarct without evidence of hemorrhagic transformation.   2.  Several scattered punctate acute infarcts in the bilateral cerebral hemispheres likely central embolic etiology.   3.  Mild diffuse cerebral substance loss.   4.  Mild microangiopathic ischemic change versus demyelination or gliosis.      MR-MRA HEAD-W/O   Final Result      MRA of the [place] within normal limits.      EC-ECHOCARDIOGRAM COMPLETE W/O CONT   Final Result      CT-HEAD W/O   Final Result      Findings consistent with early changes of left MCA territory infarction.               INTERPRETING LOCATION:  [address] [place] [address], [id number]      IR-THROMBO MECHANICAL ARTERY,INIT   Final Result      1.Occluded right common carotid artery.   *  No intervention was performed.   2.Severe stenosis at the origin of left internal carotid artery.   *  Successful left carotid angioplasty and stent placement with embolic protection device.   3.Left M1 segment thrombus.   *  Successful mechanical thrombectomy   *  TICI 2b flow.      DX-CHEST-PORTABLE (1 VIEW)   Final Result      No acute cardiopulmonary abnormality.      CT-CTA NECK WITH & W/O-POST PROCESSING   Final Result      1.  Occlusion  of the right common carotid artery just distal to its origin related intraluminal thrombus which extends from that point to the carotid bifurcation.   2.  High-grade stenosis of both internal carotid arterial origins      CT-CTA HEAD WITH & W/O-POST PROCESS   Final Result      1.  Occlusion of the M1 segment of the left middle cerebral artery.   2.  Unremarkable CT angiogram of the head otherwise.   3.  No acute abnormality of the precontrast scan.      These findings were discussed with RADHA HUMPHRIES on 1/23/2021 at 2236 hours.      CT-CEREBRAL PERFUSION ANALYSIS   Final Result      1.  Cerebral blood flow less than 30% likely representing completed infarct = 17 mL.      2.  T Max more than 6 seconds likely representing combination of completed infarct and ischemia = 138 mL.      3.  Mismatched volume likely representing ischemic brain/penumbra =  121 mL      4.  Please note that the cerebral perfusion was performed on the limited brain tissue around the basal ganglia region. Infarct/ischemia outside the CT perfusion sections can be missed in this study.      CT-HEAD W/O   Final Result      Normal CT scan of the head without contrast.               INTERPRETING LOCATION:  [address] [place] [address], [facility]           Assessment/Plan  * Cerebrovascular accident (CVA) due to occlusion of left middle cerebral artery (HCC)  Assessment & Plan  Mr. Leija presented on 1/23/2021 with complaints of right-sided facial droop and aphasia from an outside facility.  He was found to have a left M1 thrombus and underwent mechanical thrombectomy which was successful.  Additionally he was found to have left-sided carotid stenosis of 70% for which a stent was placed.  He was then started on an Integrilin drip and transferred to the intensive care unit.  He was also noted to have right common carotid artery occlusion with retrograde filling. He had some post-intervention bradycardia and hypotension likely secondary to carotid  hypersensitivity after intervention. Due to timing he did not receive TPA.  -MRI 1/25/2021 showed acute left MCA infarct as well as bilateral scattered tiny embolic infarcts in both hemispheres. Echocardiography did not show any acute thrombus but did show bi-atrial enlargement consistent with possible underlying atrial fibrillation.  -Frequent neurochecks Q4H  -DAPT with clopidogrel and aspirin for 6 months  -Atorvastatin 80mg daily  -Outpatient neuroIR follow-up  -Transfer to medical floor  -Telemetry monitoring   -Will need Zio patch or other ambulatory electrocardiography, recommend this is obtained by primary care provider    1/26   -PT/OT/SLP, minimal assist  Patient reports having 24/ 7 assistance with  Roommates  Current physiatry, no rehab needs  Continue cardiac not monitoring, outpatient follow-up with primary care provider for referral for cardiac monitoring    1/27 right visual field vision loss, as per pt chronic  Repeat CT head, cw known infarct  D/w neurology, encourage activity, cleared for dc with assistance  Home health assistance pending          -Outpatient tobacco and alcohol cessation          Internal carotid artery stenosis, left  Assessment & Plan  Status post stenting for 70% with symptomatic carotid disease in the affected vessel distribution  DAPT for 6 months, follow-up outpatient with NeuroIR        Carotid occlusion, right  Assessment & Plan  Complete occlusion of the common carotid artery seen with retrograde filling on CTA of the neck, generally no intravascular intervention indicated with complete occlusion.  Medical therapy with high-dose statin and antiplatelet therapy    Prediabetes  Assessment & Plan  Borderline prediabetes with A1C of 5.9, he would benefit from a trial of outpatient lifestyle change prior to pharmacotherapy.    Tobacco abuse  Assessment & Plan   on cessation and hazards associated with continued use.     Alcohol abuse  Assessment & Plan  Per his family,  high quantities of alcohol intake, approximately 4-6 sixpacks of beer daily.  Monitor for withdrawal  Check electrolytes magnesium and phosphorus   High dose IV thiamine/MVI/folate       VTE prophylaxis: lovenox

## 2021-01-27 NOTE — THERAPY
Occupational Therapy  Daily Treatment     Patient Name: Sánchez Leija  Age:  60 y.o., Sex:  male  Medical Record #: [id number]  Today's Date: 1/27/2021       Precautions: Fall Risk    Assessment    Pt seen for OT tx. Demonstrated improved OOB activity tolerance and balance this session from previous session. Completed LB dressing, standing at the sink and toilet txf w/ supv. Pt pleasant and cooperative during session. Pt c/o R visual field cut but states that this is baseline. RN notified. Pt has met current goals.     Plan    Will discuss POC w/ OT regarding goals and frequency.     DC Equipment Recommendations: None  Discharge Recommendations: Recommend home health for continued occupational therapy services       01/27/21 1138   Cognition    Cognition / Consciousness X   Safety Awareness Impaired   New Learning Impaired   Comments pleasant and cooperative, extra time aphasia/word finding difficulty    Active ROM Upper Body   Active ROM Upper Body  WDL   Strength Upper Body   Upper Body Strength  WDL   Balance   Sitting Balance (Static) Fair +   Sitting Balance (Dynamic) Fair +   Standing Balance (Static) Fair   Standing Balance (Dynamic) Fair   Weight Shift Sitting Good   Weight Shift Standing Fair   Bed Mobility    Supine to Sit Supervised   Sit to Supine Supervised   Scooting Supervised   Rolling Supervised   Activities of Daily Living   Grooming Supervision;Standing   Upper Body Dressing Supervision   Lower Body Dressing Supervision   Toileting Supervision   Functional Mobility   Sit to Stand Supervised   Bed, Chair, Wheelchair Transfer Supervised   Toilet Transfers Supervised   Short Term Goals   Short Term Goal # 1 Pt will demo LB dressing w/ SPV   Goal Outcome # 1 Goal met   Short Term Goal # 2 Pt will demo standing grooming w/ SPV   Goal Outcome # 2 Goal met   Short Term Goal # 3 Pt will demo toilet txf w/ SPV   Goal Outcome # 3 Goal met   Anticipated Discharge Equipment and Recommendations   DC Equipment  Recommendations None   Discharge Recommendations Recommend home health for continued occupational therapy services

## 2021-01-27 NOTE — DISCHARGE PLANNING
Received Choice form at 3153  Agency/Facility Name: [facility]   Referral sent per Choice form @ 2024

## 2021-01-27 NOTE — DISCHARGE PLANNING
Anticipated Discharge Disposition: Home     Action: Lsw received call back from dad regarding transportation. Joss stated that pt will dc with Ifrah Kingsley ([phone]) at [address] [place] [address]. Joss asked if pt were to dc tonight, Ifrah can pick him up before weather gets worse. If pt needs to stay overnight, Ifrah will be unable to pick him up and Joss asked for transportation to be set up and he will cover cost. Lsw stated they will asked DO and get back to Joss. Lsw updated RN and DO.     Barriers to Discharge: Weather    Plan: Lsw to f/u with Joss      ADDENDUM 1559: Lsw updated Joss that pt may stay overnight and if Ifrah will be unable to help with transportation, Lsw will assist with transportation. Joss voiced understanding.

## 2021-01-27 NOTE — DISCHARGE PLANNING
Agency/Facility Name: [facility]   Spoke To: Doris  Outcome: Will check and see if they are contracted with patients insurance, physical address given. Unable to see patient until possibly next week once the weather clears up.

## 2021-01-27 NOTE — DISCHARGE PLANNING
Anticipated Discharge Disposition: Home with [facility]    Action: LSW received phone call from pt's dad, Joss regarding updated pt DC plans. LSW informed Joss that the doctor wanted to wait until [facility] accepted pt before DC. Joss reported that the plan is for the friend Shane [phone] to pick pt up from the hospital upon DC, however the roads to [facility] are completely closed at this time. Joss reported that if pt has to DC today Shane's mother-in-law may be able to  the pt and house him for a night in [place].    Barriers to Discharge: HHC acceptance and transportation.    Plan: Care coordination will follow-up with HHC referral and continue to follow up with any DC needs and barriers.

## 2021-01-27 NOTE — THERAPY
"Speech Language Pathology   Initial Assessment     Patient Name: Sánchez Leija  AGE:  60 y.o., SEX:  male  Medical Record #: [id number]  Today's Date: 1/27/2021     Precautions  Precautions: Fall Risk, Swallow Precautions ( See Comments)  Comments: (P) pt with moderate to severe aphasia    Assessment    Patient is 60 y.o. male with a diagnosis of L CVA. Acute admit hist per EMR: Sánchez Leija is a 60 y.o. male with a past medical history of heavy alcohol tobacco use who presented on 1/23/2021 from an outside facility with complaints of right-sided weakness, right facial droop, and severe aphasia.  He did not receive TPA prior to or after arrival, however subsequent imaging was notable for a left M1 occlusion, as well as 70% left internal carotid stenosis and complete right common carotid occlusion with retrograde filling.  He was evaluated by neurology and neuro interventional radiology, and subsequently underwent mechanical thrombectomy and placement of a left carotid stent. He was then started on an Integrilin drip and transferred to the intensive care unit for close monitoring.  He was then loaded with dual antiplatelet therapy and the Integrilin drip was stopped.    The WAB Bedside form revealed moderate to severe aphasia deficits with fluency, auditory verbal compreh, and repetition scores indicating possible conduction type aphasia. Bedside Aphasia score=65 and Bedside Language score 61.25. Verbal expression is moderate to severe with use of communicative words and frequent non-words or words that were non-communicative. Pt with decreased awareness of communication difficulty stating he was at his normal and \"going back to home and work.\"  Additional factors influencing patient status/progress: pt attempts all tasks, possible poor insight.     Plan    Recommend Speech Therapy 5 times per week until therapy goals are met for the following treatments:  Expression Training.    Discharge Recommendations: " Recommend post-acute placement for additional speech therapy services prior to discharge home       01/27/21 1205   Verbal Expression   Verbal Expression / Aphasia Eval (WDL) X   Verbal Output Automatic Minimal (4)   Verbal Output Conversation Severe (2)   Verbal Output Functional Severe (2)   Repetition: Single Words Minimal (4)   Naming Minimal (4)   Word Finding Deficits Severe (2)   Paraphasias Severe (2)   Skilled Intervention Compensatory Strategies;Verbal Cueing   Auditory Comprehension   Auditory Comprehension (WDL) X   Follows Three Unit Commands Severe (2)   Understands Simple, Structured Conversation  Moderate (3)   Skilled Intervention Compensatory Strategies;Verbal Cueing   Reading Comprehension   Reading Comprehension (WDL) X   Reading Phrases Moderate (3)   Reading Sentences Moderate (3)   Skilled Intervention Compensatory Strategies;Verbal Cueing   Written Expression   Written Expression (WDL) X   Functional Writing: Name Supervision (5)   Functional Writing Dictation: Word Severe (2)   Overall Legibility Minimal (4)   Skilled Intervention Compensatory Strategies;Verbal Cueing   Aphasia Compensatory Strategies   Compensatory Strategies Used To Communicate Yes / No Responses;Phonemic Cues;Phase Completion;Reading Written Words;Stop / Pause Cues;Multi Modality Cues;Pictures / Objects   Outcome Measures   Outcome Measures Utilized WAB-Bedside   WAB - Bedside (Western Aphasia Battery)   Spontaneous Speech: Content  9   Spontaneous Speech: Fluency 5   Auditory Comprehension 10   Sequential Commands 0   Repetition Score  7   Object Naming 8   Reading 5   Writing 5   Bedside Asphasia Score 65   Bedside Language Score 61.25   Short Term Goals   Short Term Goal # 2 Pt will use word retrieval strategies following written and verbal educ with SLP with max cues and 70% accuracy.    Goal Outcome # 2  Goal not met   Short Term Goal # 3 Pt will copy at the sentence level with 90% accuracy and min cues.    Goal  Outcome  # 3 Goal not met

## 2021-01-27 NOTE — DISCHARGE PLANNING
Anticipated Discharge Disposition: Home     Action: Lsw called and left  for dad Joss to arrange transportation for tomorrow.     Barriers to Discharge: HH closed due to weather    Plan: Lsw to assist and f/u

## 2021-01-27 NOTE — DISCHARGE PLANNING
Agency/Facility Name: [facility]   Spoke To: On call representative   Outcome: Unable to give any update on insurance verification- office is closed due to weather.

## 2021-01-28 VITALS
HEART RATE: 62 BPM | HEIGHT: 74 IN | RESPIRATION RATE: 16 BRPM | WEIGHT: 157.63 LBS | BODY MASS INDEX: 20.23 KG/M2 | DIASTOLIC BLOOD PRESSURE: 48 MMHG | SYSTOLIC BLOOD PRESSURE: 105 MMHG | OXYGEN SATURATION: 97 % | TEMPERATURE: 97.4 F

## 2021-01-28 LAB
ANION GAP SERPL CALC-SCNC: 10 MMOL/L (ref 7–16)
BASOPHILS # BLD AUTO: 0.6 % (ref 0–1.8)
BASOPHILS # BLD: 0.04 K/UL (ref 0–0.12)
BUN SERPL-MCNC: 11 MG/DL (ref 8–22)
CALCIUM SERPL-MCNC: 8.7 MG/DL (ref 8.5–10.5)
CHLORIDE SERPL-SCNC: 105 MMOL/L (ref 96–112)
CO2 SERPL-SCNC: 22 MMOL/L (ref 20–33)
CREAT SERPL-MCNC: 0.9 MG/DL (ref 0.5–1.4)
EOSINOPHIL # BLD AUTO: 0.16 K/UL (ref 0–0.51)
EOSINOPHIL NFR BLD: 2.5 % (ref 0–6.9)
ERYTHROCYTE [DISTWIDTH] IN BLOOD BY AUTOMATED COUNT: 46.7 FL (ref 35.9–50)
GLUCOSE SERPL-MCNC: 87 MG/DL (ref 65–99)
HCT VFR BLD AUTO: 38.2 % (ref 42–52)
HGB BLD-MCNC: 12.7 G/DL (ref 14–18)
IMM GRANULOCYTES # BLD AUTO: 0.02 K/UL (ref 0–0.11)
IMM GRANULOCYTES NFR BLD AUTO: 0.3 % (ref 0–0.9)
LYMPHOCYTES # BLD AUTO: 1.69 K/UL (ref 1–4.8)
LYMPHOCYTES NFR BLD: 26.8 % (ref 22–41)
MAGNESIUM SERPL-MCNC: 2 MG/DL (ref 1.5–2.5)
MCH RBC QN AUTO: 33.4 PG (ref 27–33)
MCHC RBC AUTO-ENTMCNC: 33.2 G/DL (ref 33.7–35.3)
MCV RBC AUTO: 100.5 FL (ref 81.4–97.8)
MONOCYTES # BLD AUTO: 0.72 K/UL (ref 0–0.85)
MONOCYTES NFR BLD AUTO: 11.4 % (ref 0–13.4)
NEUTROPHILS # BLD AUTO: 3.68 K/UL (ref 1.82–7.42)
NEUTROPHILS NFR BLD: 58.4 % (ref 44–72)
NRBC # BLD AUTO: 0 K/UL
NRBC BLD-RTO: 0 /100 WBC
PLATELET # BLD AUTO: 162 K/UL (ref 164–446)
PMV BLD AUTO: 11.6 FL (ref 9–12.9)
POTASSIUM SERPL-SCNC: 4.3 MMOL/L (ref 3.6–5.5)
RBC # BLD AUTO: 3.8 M/UL (ref 4.7–6.1)
SODIUM SERPL-SCNC: 137 MMOL/L (ref 135–145)
WBC # BLD AUTO: 6.3 K/UL (ref 4.8–10.8)

## 2021-01-28 PROCEDURE — 700102 HCHG RX REV CODE 250 W/ 637 OVERRIDE(OP): Performed by: INTERNAL MEDICINE

## 2021-01-28 PROCEDURE — 36415 COLL VENOUS BLD VENIPUNCTURE: CPT

## 2021-01-28 PROCEDURE — 700111 HCHG RX REV CODE 636 W/ 250 OVERRIDE (IP): Performed by: HOSPITALIST

## 2021-01-28 PROCEDURE — 99239 HOSP IP/OBS DSCHRG MGMT >30: CPT | Performed by: INTERNAL MEDICINE

## 2021-01-28 PROCEDURE — 85025 COMPLETE CBC W/AUTO DIFF WBC: CPT

## 2021-01-28 PROCEDURE — A9270 NON-COVERED ITEM OR SERVICE: HCPCS | Performed by: STUDENT IN AN ORGANIZED HEALTH CARE EDUCATION/TRAINING PROGRAM

## 2021-01-28 PROCEDURE — A9270 NON-COVERED ITEM OR SERVICE: HCPCS | Performed by: INTERNAL MEDICINE

## 2021-01-28 PROCEDURE — 700102 HCHG RX REV CODE 250 W/ 637 OVERRIDE(OP): Performed by: STUDENT IN AN ORGANIZED HEALTH CARE EDUCATION/TRAINING PROGRAM

## 2021-01-28 PROCEDURE — 83735 ASSAY OF MAGNESIUM: CPT

## 2021-01-28 PROCEDURE — 80048 BASIC METABOLIC PNL TOTAL CA: CPT

## 2021-01-28 RX ORDER — M-VIT,TX,IRON,MINS/CALC/FOLIC 27MG-0.4MG
1 TABLET ORAL DAILY
Qty: 30 TAB | Refills: 11 | Status: SHIPPED | OUTPATIENT
Start: 2021-01-29

## 2021-01-28 RX ORDER — ASPIRIN 81 MG/1
81 TABLET ORAL DAILY
Qty: 30 TAB | Refills: 0 | Status: SHIPPED | OUTPATIENT
Start: 2021-01-29

## 2021-01-28 RX ORDER — LANOLIN ALCOHOL/MO/W.PET/CERES
100 CREAM (GRAM) TOPICAL DAILY
Qty: 30 TAB | Refills: 0 | Status: SHIPPED | OUTPATIENT
Start: 2021-01-29

## 2021-01-28 RX ORDER — FOLIC ACID 1 MG/1
1 TABLET ORAL DAILY
Qty: 30 TAB | Refills: 0 | Status: SHIPPED | OUTPATIENT
Start: 2021-01-29

## 2021-01-28 RX ORDER — CLOPIDOGREL BISULFATE 75 MG/1
75 TABLET ORAL DAILY
Qty: 30 TAB | Refills: 0 | Status: SHIPPED | OUTPATIENT
Start: 2021-01-29

## 2021-01-28 RX ORDER — ATORVASTATIN CALCIUM 80 MG/1
80 TABLET, FILM COATED ORAL EVERY EVENING
Qty: 30 TAB | Refills: 0 | Status: SHIPPED | OUTPATIENT
Start: 2021-01-28

## 2021-01-28 RX ADMIN — FOLIC ACID 1 MG: 1 TABLET ORAL at 05:30

## 2021-01-28 RX ADMIN — CLOPIDOGREL BISULFATE 75 MG: 75 TABLET ORAL at 05:30

## 2021-01-28 RX ADMIN — ENOXAPARIN SODIUM 40 MG: 40 INJECTION SUBCUTANEOUS at 05:30

## 2021-01-28 RX ADMIN — Medication 100 MG: at 05:30

## 2021-01-28 RX ADMIN — ASPIRIN 81 MG: 81 TABLET, COATED ORAL at 05:29

## 2021-01-28 RX ADMIN — MULTIPLE VITAMINS W/ MINERALS TAB 1 TABLET: TAB at 05:29

## 2021-01-28 NOTE — DISCHARGE PLANNING
Anticipated Discharge Disposition: Home    Action: LSW made phone call to pt's dad Joss to inform him of pt's DC today. LSW asked Joss if Ifrah ([phone]) is able to provide transportation. Joss stated he will call Ifrah and ask as he is uncertain of the road conditions and then call this LSW back.    Addendum 1/28 @7213    LSW received phone call from pt's dad Joss. Joss reported that Ifrah will be able to pick the pt up around 1200.    0925    LSW received phone call from Ifrah Kingsley. Ifrah reported that she just spoke with her son Brissa [phone], who is also a friend of the pt. Ifrah stated that Brissa will be the one to  the pt and he will be able to come to the hospital between [phone].     Barriers to Discharge: Transportation    Plan: LSW to follow up with Joss and arrange for transportation as necessary.

## 2021-01-28 NOTE — DISCHARGE INSTRUCTIONS
Discharge Instructions    Discharged to home by car with friend. Discharged via wheelchair, hospital escort: Yes.  Special equipment needed: Not Applicable    Be sure to schedule a follow-up appointment with your primary care doctor or any specialists as instructed.     Discharge Plan:   Diet Plan: Discussed  Activity Level: Discussed  Confirmed Symptoms Management: Discussed  Medication Reconciliation Updated: Yes    I understand that a diet low in cholesterol, fat, and sodium is recommended for good health. Unless I have been given specific instructions below for another diet, I accept this instruction as my diet prescription.   Other diet:    Soft-Food Eating Plan  A soft-food eating plan includes foods that are safe and easy to chew and swallow. Your health care provider or dietitian can help you find foods and flavors that fit into this plan. Follow this plan until your health care provider or dietitian says it is safe to start eating other foods and food textures.  What are tips for following this plan?  General guidelines    · Take small bites of food, or cut food into pieces about ½ inch or smaller. Bite-sized pieces of food are easier to chew and swallow.  · Eat moist foods. Avoid overly dry foods.  · Avoid foods that:  ? Are difficult to swallow, such as dry, chunky, crispy, or sticky foods.  ? Are difficult to chew, such as hard, tough, or stringy foods.  ? Contain nuts, seeds, or fruits.  · Follow instructions from your dietitian about the types of liquids that are safe for you to swallow. You may be allowed to have:  ? Thick liquids only. This includes only liquids that are thicker than honey.  ? Thin and thick liquids. This includes all beverages and foods that become liquid at room temperature.  · To make thick liquids:  ? Purchase a commercial liquid thickening powder. These are available at grocery stores and pharmacies.  ? Mix the thickener into liquids according to instructions on the  label.  ? Purchase ready-made thickened liquids.  ? Thicken soup by pureeing, straining to remove chunks, and adding flour, potato flakes, or corn starch.  ? Add commercial thickener to foods that become liquid at room temperature, such as milk shakes, yogurt, ice cream, gelatin, and sherbet.  · Ask your health care provider whether you need to take a fiber supplement.  Cooking  · Cook meats so they stay tender and moist. Use methods like braising, stewing, or baking in liquid.  · Cook vegetables and fruit until they are soft enough to be mashed with a fork.  · Peel soft, fresh fruits such as peaches, nectarines, and melons.  · When making soup, make sure chunks of meat and vegetables are smaller than ½ inch.  · Reheat leftover foods slowly so that a tough crust does not form.  What foods are allowed?  The items listed below may not be a complete list. Talk with your dietitian about what dietary choices are best for you.  Grains  Breads, muffins, pancakes, or waffles moistened with syrup, jelly, or butter. Dry cereals well-moistened with milk. Moist, cooked cereals. Well-cooked pasta and rice.  Vegetables  All soft-cooked vegetables. Shredded lettuce.  Fruits  All canned and cooked fruits. Soft, peeled fresh fruits. Strawberries.  Dairy  Milk. Cream. Yogurt. Cottage cheese. Soft cheese without the rind.  Meats and other protein foods  Tender, moist ground meat, poultry, or fish. Meat cooked in gravy or sauces. Eggs.  Sweets and desserts  Ice cream. Milk shakes. Sherbet. Pudding.  Fats and oils  Butter. Margarine. Olive, canola, sunflower, and grapeseed oil. Smooth salad dressing. Smooth cream cheese. Mayonnaise. Gravy.  What foods are not allowed?  The items listed bemay not be a complete list. Talk with your dietitian about what dietary choices are best for you.  Grains  Coarse or dry cereals, such as bran, granola, and shredded wheat. Tough or chewy crusty breads, such as [place] bread or baguettes. Breads with  nuts, seeds, or fruit.  Vegetables  All raw vegetables. Cooked corn. Cooked vegetables that are tough or stringy. Tough, crisp, fried potatoes and potato skins.  Fruits  Fresh fruits with skins or seeds, or both, such as apples, pears, and grapes. Stringy, high-pulp fruits, such as papaya, pineapple, coconut, and sg. Fruit leather and all dried fruit.  Dairy  Yogurt with nuts or coconut.  Meats and other protein foods  Hard, dry sausages. Dry meat, poultry, or fish. Meats with gristle. Fish with bones. Fried meat or fish. Lunch meat and hotdogs. Nuts and seeds. [place] peanut butter or other nut butters.  Sweets and desserts  Cakes or cookies that are very dry or chewy. Desserts with dried fruit, nuts, or coconut. Fried pastries. Very rich pastries.  Fats and oils  Cream cheese with fruit or nuts. Salad dressings with seeds or chunks.  Summary  · A soft-food eating plan includes foods that are safe and easy to swallow. Generally, the foods should be soft enough to be mashed with a fork.  · Avoid foods that are dry, hard to chew, crunchy, sticky, stringy, or crispy.  · Ask your health care provider whether you need to thicken your liquids and if you need to take a fiber supplement.  This information is not intended to replace advice given to you by your health care provider. Make sure you discuss any questions you have with your health care provider.  Document Released: 03/26/2009 Document Revised: 04/09/2020 Document Reviewed: 02/20/2018  Elsevier Patient Education © 2020 [employer]      Special Instructions:     Stroke/CVA/TIA/Hemorrhagic Ischemia Discharge Instructions  You have had a stroke. Your risk factors have been identified as follows:  Gender - Men are at a higher risk than women  It is important that you reduce your risk factors to avoid another stroke in the future. Here are some general guidelines to follow:  · Eat healthy - avoid food high in fat.  · Get regular exercise.  · Maintain a healthy  weight.  · Avoid smoking.  · Avoid alcohol and illegal drug use.  · Take your medications as directed.  For more information regarding risk factors, refer to pages 17-19 in your Stroke Patient Education Guide. Stroke Education Guide was given to patient.    Warning signs of a stroke include (which can also be found on page 3 of your Stroke Patient Education Guide):  · Sudden numbness of weakness of the face, arm or leg (especially on one side of the body).  · Sudden confusion, trouble speaking or understanding.  · Sudden trouble seeing in one or both eyes.  · Sudden trouble walking, dizziness, loss of balance or coordination.  · Sudden severe headache with no known cause.  It is very important to get treatment quickly when a stroke occurs. If you experience any of the above warning signs, call [phone] immediately.     Some patients who have had a stroke will be going home on a blood thinner medication called Warfarin (Coumadin).  This medication requires very close monitoring and follow up.  This follow up can be provided by either your Primary Care Physician or by [facility] Outpatient Anticoagulation Service.  The Outpatient Anticoagulation Service is located at the [facility] at [facility] ([facility]).  If you do not know when your follow up appointment is scheduled, call [phone] to verify your appointment time.    No   · Is patient discharged on Warfarin / Coumadin?       Depression / Suicide Risk    As you are discharged from this [facility], it is important to learn how to keep safe from harming yourself.    Recognize the warning signs:  · Abrupt changes in personality, positive or negative- including increase in energy   · Giving away possessions  · Change in eating patterns- significant weight changes-  positive or negative  · Change in sleeping patterns- unable to sleep or sleeping all the time   · Unwillingness or inability to  communicate  · Depression  · Unusual sadness, discouragement and loneliness  · Talk of wanting to die  · Neglect of personal appearance   · Rebelliousness- reckless behavior  · Withdrawal from people/activities they love  · Confusion- inability to concentrate     If you or a loved one observes any of these behaviors or has concerns about self-harm, here's what you can do:  · Talk about it- your feelings and reasons for harming yourself  · Remove any means that you might use to hurt yourself (examples: pills, rope, extension cords, firearm)  · Get professional help from the community (Mental Health, Substance Abuse, psychological counseling)  · Do not be alone:Call your Safe Contact- someone whom you trust who will be there for you.  · Call your local CRISIS HOTLINE 853-9874 or 187-640-3834  · Call your local Children's Mobile Crisis Response Team [place] (076) 739-3504 or www.[employer]  · Call the toll free National Suicide Prevention Hotlines   · National Suicide Prevention Lifeline 340-035-BKVQ (7791)  · National Hope Line Network 800-SUICIDE (871-3428)

## 2021-01-28 NOTE — PROGRESS NOTES
Monitor Summary: SB/SR HR 50s-60s. WI 0.18, QRS: 0.08, QT: 0.40 with rare PAC. Per strip from monitor room.

## 2021-01-28 NOTE — PROGRESS NOTES
Pt discharge teaching completed, questions/concerns addressed. LM for Ifrah (friend) regarding discharge updates. All belongings sent with pt at time of discharge.

## 2021-01-28 NOTE — DISCHARGE SUMMARY
Discharge Summary    CHIEF COMPLAINT ON ADMISSION  Chief Complaint   Patient presents with   • Possible Stroke       Reason for Admission  Stroke IR transfer     Admission Date  1/23/2021    CODE STATUS  Prior    HPI & HOSPITAL COURSE    Sánchez Leija is a 60 y.o. male with a past medical history of heavy alcohol tobacco use who presented on 1/23/2021 from an outside facility with complaints of right-sided weakness, right facial droop, and severe aphasia.  He did not receive TPA prior to or after arrival, however subsequent imaging was notable for a left M1 occlusion, as well as 70% left internal carotid stenosis and complete right common carotid occlusion with retrograde filling.  He was evaluated by neurology and neuro interventional radiology, and subsequently underwent mechanical thrombectomy and placement of a left carotid stent. He was then started on an Integrilin drip and transferred to the intensive care unit for close monitoring.  He was then loaded with dual antiplatelet therapy and the Integrilin drip was stopped.      MRI of the brain shows acute small to moderate left-sided middle cerebral artery territory infarct without evidence of hemorrhagic transformation.  There is several scattered punctate acute infarcts in the bilateral cerebral hemispheres likely a central embolic etiology.  MRA of the head, [place] was within normal limits.  Echocardiogram  normal systolic function normal systolic function, with no evidence of.  Patient remained in sinus rhythm during this admission.    Patient did have some right visual field cuts noted during this admission.  As per patient this is chronic.  I discussed these findings with the neurologist who states that due to acute infarct, patient may have compensated or not noticed right visual field loss, repeat CT of the head did not show any acute new acute infarct.  Patient is ambulatory with supervised assistance.    We did discuss these findings with  patient's family, He will discharge to home with family and friends as well as home health.      Therefore, he is discharged in good and stable condition to home with organized home healthcare and close outpatient follow-up.    The patient met 2-midnight criteria for an inpatient stay at the time of discharge.    Discharge Date  1/28    FOLLOW UP ITEMS POST DISCHARGE  F/u with pcp in 1 week, stroke bridge clinic as scheduled    DISCHARGE DIAGNOSES  Principal Problem:    Cerebrovascular accident (CVA) due to occlusion of left middle cerebral artery (HCC) POA: Unknown  Active Problems:    Internal carotid artery stenosis, left POA: Unknown    Carotid occlusion, right POA: Unknown    Alcohol abuse POA: Unknown    Tobacco abuse POA: Unknown    Prediabetes POA: Unknown  Resolved Problems:    * No resolved hospital problems. *      FOLLOW UP  No future appointments.  Primary Care Provider    Schedule an appointment as soon as possible for a visit in 1 week      [facility]  [phone]    a referral has been placed, they will contact you to schedule an appointment      MEDICATIONS ON DISCHARGE     Medication List      START taking these medications      Instructions   aspirin 81 MG EC tablet  Start taking on: January 29, 2021   Take 1 Tab by mouth every day.  Dose: 81 mg     atorvastatin 80 MG tablet  Commonly known as: LIPITOR   Take 1 Tab by mouth every evening.  Dose: 80 mg     clopidogrel 75 MG Tabs  Start taking on: January 29, 2021  Commonly known as: PLAVIX   Take 1 Tab by mouth every day.  Dose: 75 mg     folic acid 1 MG Tabs  Start taking on: January 29, 2021  Commonly known as: FOLVITE   Take 1 Tab by mouth every day.  Dose: 1 mg     therapeutic multivitamin-minerals Tabs  Start taking on: January 29, 2021   Take 1 Tab by mouth every day.  Dose: 1 Tab     thiamine 100 MG tablet  Start taking on: January 29, 2021  Commonly known as: THIAMINE   Take 1 Tab by mouth every day.  Dose: 100 mg             Allergies  Not on File    DIET  No orders of the defined types were placed in this encounter.      ACTIVITY  As tolerated.  Weight bearing as tolerated    CONSULTATIONS  Physiatry  intensivist  Neurology  IR    PROCEDURES  Left carotid angioplasty and stent placement and thrombectomy    LABORATORY  Lab Results   Component Value Date    SODIUM 137 01/28/2021    POTASSIUM 4.3 01/28/2021    CHLORIDE 105 01/28/2021    CO2 22 01/28/2021    GLUCOSE 87 01/28/2021    BUN 11 01/28/2021    CREATININE 0.90 01/28/2021        Lab Results   Component Value Date    WBC 6.3 01/28/2021    HEMOGLOBIN 12.7 (L) 01/28/2021    HEMATOCRIT 38.2 (L) 01/28/2021    PLATELETCT 162 (L) 01/28/2021        Total time of the discharge process exceeds 42 minutes.

## 2021-01-28 NOTE — PROGRESS NOTES
Patient discharged today - patient agreeable to plan. D/c instructions reviewed with patient - ?'s/concerns answered. Stroke education provided.